# Patient Record
Sex: MALE | Race: BLACK OR AFRICAN AMERICAN | Employment: FULL TIME | URBAN - METROPOLITAN AREA
[De-identification: names, ages, dates, MRNs, and addresses within clinical notes are randomized per-mention and may not be internally consistent; named-entity substitution may affect disease eponyms.]

---

## 2019-05-30 ENCOUNTER — PREP FOR PROCEDURE (OUTPATIENT)
Dept: PAIN MANAGEMENT | Age: 59
End: 2019-05-30

## 2019-05-30 ENCOUNTER — OFFICE VISIT (OUTPATIENT)
Dept: PAIN MANAGEMENT | Age: 59
End: 2019-05-30
Payer: COMMERCIAL

## 2019-05-30 VITALS
DIASTOLIC BLOOD PRESSURE: 80 MMHG | TEMPERATURE: 98.6 F | OXYGEN SATURATION: 98 % | WEIGHT: 175 LBS | SYSTOLIC BLOOD PRESSURE: 128 MMHG | HEART RATE: 55 BPM | HEIGHT: 73 IN | RESPIRATION RATE: 16 BRPM | BODY MASS INDEX: 23.19 KG/M2

## 2019-05-30 DIAGNOSIS — M54.12 CERVICAL RADICULOPATHY: Primary | ICD-10-CM

## 2019-05-30 DIAGNOSIS — M50.90 CERVICAL DISC DISORDER: Primary | ICD-10-CM

## 2019-05-30 DIAGNOSIS — G89.4 CHRONIC PAIN SYNDROME: ICD-10-CM

## 2019-05-30 DIAGNOSIS — M54.12 CERVICAL RADICULOPATHY: ICD-10-CM

## 2019-05-30 DIAGNOSIS — M47.22 OSTEOARTHRITIS OF SPINE WITH RADICULOPATHY, CERVICAL REGION: ICD-10-CM

## 2019-05-30 DIAGNOSIS — M47.812 CERVICAL FACET JOINT SYNDROME: ICD-10-CM

## 2019-05-30 PROCEDURE — 99204 OFFICE O/P NEW MOD 45 MIN: CPT | Performed by: PAIN MEDICINE

## 2019-05-30 RX ORDER — HYDROCODONE BITARTRATE AND ACETAMINOPHEN 5; 325 MG/1; MG/1
TABLET ORAL
Refills: 0 | COMMUNITY
Start: 2019-05-22

## 2019-05-30 RX ORDER — METOPROLOL SUCCINATE 25 MG/1
TABLET, EXTENDED RELEASE ORAL
Refills: 0 | COMMUNITY
Start: 2019-05-22

## 2019-05-30 RX ORDER — CLOPIDOGREL BISULFATE 75 MG/1
TABLET ORAL
Refills: 0 | COMMUNITY
Start: 2019-05-22

## 2019-05-30 RX ORDER — GABAPENTIN 300 MG/1
CAPSULE ORAL
Refills: 0 | COMMUNITY
Start: 2019-05-22

## 2019-05-30 SDOH — HEALTH STABILITY: MENTAL HEALTH: HOW MANY STANDARD DRINKS CONTAINING ALCOHOL DO YOU HAVE ON A TYPICAL DAY?: 1 OR 2

## 2019-05-30 NOTE — PROGRESS NOTES
James Valle presents to the [facility] on 5/30/2019. Niida Bunch is complaining of pain in his neck. . The pain is intermittent. The pain is described as aching, numb and stiff. . Pain is rated on his best day at a 4, on his worst day at a 7, and on average at a 5 on the VAS scale. He took his last dose of Norco this AM.. Any procedures since your last visit: No  He has been on anticoagulation medications to include Plavix and is managed by Kim Catalan DO  .      Pacemaker or defibrilator: No Physician managing device is NA.       /80   Pulse 55   Temp 98.6 °F (37 °C) (Oral)   Resp 16   Ht 6' 1\" (1.854 m)   Wt 175 lb (79.4 kg)   SpO2 98%   BMI 23.09 kg/m²

## 2019-05-30 NOTE — PROGRESS NOTES
[address] [facility], [address] [facility]      [phone]          Consult Note      Patient:  MARTINA Cantu 1960    Date of Service:  19    Requesting Physician:  Felix Walker MD    Reason for Consult:      Patient presents with complaints of neck pain that started one year ago and has been stable    HISTORY OF PRESENT ILLNESS:      Pain does radiate to right upper extremity down to the hand. He  has numbness, tingling of the right hand and does not have bladder or bowel dysfunction. Imaging: MRI cervical spine 2018  Spondylotic changes and facet arthrosis  Mild degenerative anterior listhesis of T1 on T2  Varying degrees of central stenosis and foraminal narrowing. Note made of mild cord flattening at C5-6 and C6-7. Previous treatments: Physical Therapy and medications. .      Past Medical History:   Diagnosis Date    Neck pain     Osteoarthritis      Past Surgical History:   Procedure Laterality Date    CARDIAC CATHETERIZATION      CORONARY ANGIOPLASTY WITH STENT PLACEMENT       Prior to Admission medications    Medication Sig Start Date End Date Taking?  Authorizing Provider   clopidogrel (PLAVIX) 75 MG tablet take 1 tablet by mouth once daily 19  Yes Historical Provider, MD   metoprolol succinate (TOPROL XL) 25 MG extended release tablet take 1 tablet by mouth once daily 19  Yes Historical Provider, MD   HYDROcodone-acetaminophen (Odetta Bare) 5-325 MG per tablet take 1 tablet by mouth three times a day 19  Yes Historical Provider, MD   gabapentin (NEURONTIN) 300 MG capsule take 1 capsule by mouth three times a day 19   Historical Provider, MD     No Known Allergies    Social History     Socioeconomic History    Marital status:      Spouse name: Not on file    Number of children: Not on file    Years of education: Not on file    Highest education level: Not on file   Occupational History    Pattern: regular, no distress    Abdomen:    Shape:non-distended and normal  Tenderness:none    Cervical spine:    Inspection:normal  Palpation:tenderness paravertebral muscles, facet loading, right, positive and tenderness. Range of motion:abnormal mildly flexion, extension rotation right and is  painful. Musculoskeletal:    Trigger points in Paraveteral:absent bilaterally  Ramos's:negative right, negative left     Extremities:    Tremors:None bilaterally upper and lower  Range of motion:Generally normal shoulders  Intact:Yes  Edema:Normal    Neurological:    Sensory:normal to light touch bilateral upper extremities  Motor:           Right Bicep5/5           Left Bicep5/5              Right Triceps5/5       Left Triceps5/5          Right Deltoid5/5     Left Deltoid5/5                  Right Quadriceps5/5          Left Quadriceps5/5           Right Gastrocnemius5/5    Left Gastrocnemius5/5  Right Ant Tibialis5/5  Left Ant Tibialis5/5  Reflexes:    Right Brachioradialis reflex2+  Left Brachioradialis reflex2+  Right Biceps reflex2+  Left Biceps reflex2+  Right Triceps reflex2+  Left Triceps reflex2+  Right Quadriceps reflex2+  Left Quadriceps reflex2+  Right Achilles reflex2+  Left Achilles reflex2+  Gait:normal    Dermatology:    Skin:no unusual rashes and no skin lesions    Impression:  Chronic neck pain with radiation to the Left upper extremity down to the hand  Cervical spine MRI: Varying degrees of central stenosis and foraminal narrowing.   Plan:  Axial neck pain with cervical radiculopathy  Discussed treatment options with the patient including interventional procedures and non opioid medications   Will schedule patient for GINGER C7-T1 right paramedian  Continue with Norco 5/325 TID through    On Plavix and ASA for cardiac stents, will get clearance  Hold off on urine screen, no opioids started  OARRS report reviewed  Patient encouraged to stay active and to lose weight  Treatment plan discussed with the patient including medications and procedure side effects     We discussed with the patient that combining opioids, benzodiazepines, alcohol, illicit drugs or sleep aids increases the risk of respiratory depression including death. We discussed that these medications may cause drowsiness, sedation or dizziness and have counseled the patient not to drive or operate machinery. We have discussed that these medications will be prescribed only by one provider. We have discussed with the patient about age related risk factors and have thoroughly discussed the importance of taking these medications as prescribed. The patient verbalizes understanding. osvaldo Frank M.D.

## 2019-06-12 ENCOUNTER — TELEPHONE (OUTPATIENT)
Dept: PAIN MANAGEMENT | Age: 59
End: 2019-06-12

## 2019-06-19 ENCOUNTER — HOSPITAL ENCOUNTER (OUTPATIENT)
Age: 59
Setting detail: OUTPATIENT SURGERY
Discharge: HOME | End: 2019-06-19
Attending: PAIN MEDICINE | Admitting: PAIN MEDICINE
Payer: COMMERCIAL

## 2019-06-19 ENCOUNTER — HOSPITAL ENCOUNTER (OUTPATIENT)
Dept: OPERATING ROOM | Age: 59
Setting detail: OUTPATIENT SURGERY
Discharge: HOME | End: 2019-06-19
Attending: PAIN MEDICINE
Payer: COMMERCIAL

## 2019-06-19 VITALS
SYSTOLIC BLOOD PRESSURE: 125 MMHG | DIASTOLIC BLOOD PRESSURE: 82 MMHG | HEART RATE: 58 BPM | OXYGEN SATURATION: 99 % | RESPIRATION RATE: 16 BRPM

## 2019-06-19 DIAGNOSIS — M54.12 CERVICAL RADICULOPATHY: ICD-10-CM

## 2019-06-19 PROCEDURE — 6360000004 HC RX CONTRAST MEDICATION: Performed by: PAIN MEDICINE

## 2019-06-19 PROCEDURE — 7100000011 HC PHASE II RECOVERY - ADDTL 15 MIN: Performed by: PAIN MEDICINE

## 2019-06-19 PROCEDURE — 3209999900 FLUORO FOR SURGICAL PROCEDURES

## 2019-06-19 PROCEDURE — 7100000010 HC PHASE II RECOVERY - FIRST 15 MIN: Performed by: PAIN MEDICINE

## 2019-06-19 PROCEDURE — 62321 NJX INTERLAMINAR CRV/THRC: CPT | Performed by: PAIN MEDICINE

## 2019-06-19 PROCEDURE — 6360000002 HC RX W HCPCS: Performed by: PAIN MEDICINE

## 2019-06-19 PROCEDURE — 2500000003 HC RX 250 WO HCPCS: Performed by: PAIN MEDICINE

## 2019-06-19 PROCEDURE — 2709999900 HC NON-CHARGEABLE SUPPLY: Performed by: PAIN MEDICINE

## 2019-06-19 PROCEDURE — 3600000005 HC SURGERY LEVEL 5 BASE: Performed by: PAIN MEDICINE

## 2019-06-19 RX ORDER — LIDOCAINE HYDROCHLORIDE 5 MG/ML
INJECTION, SOLUTION INFILTRATION; INTRAVENOUS PRN
Status: DISCONTINUED | OUTPATIENT
Start: 2019-06-19 | End: 2019-06-19 | Stop reason: ALTCHOICE

## 2019-06-19 ASSESSMENT — PAIN SCALES - GENERAL
PAINLEVEL_OUTOF10: 0
PAINLEVEL_OUTOF10: 0

## 2019-06-19 ASSESSMENT — PAIN - FUNCTIONAL ASSESSMENT: PAIN_FUNCTIONAL_ASSESSMENT: 0-10

## 2019-06-19 NOTE — OP NOTE
2019    Patient: Ash Ocampo  :  1960  Age:  62 y.o. Sex:  male     PRE-PROCEDURE DIAGNOSIS: Cervical degenerative disc disease, cervical radicular pain. POST-PROCEDURE DIAGNOSIS: Same. PROCEDURE: Fluoroscopic guided cervical epidural steroid injection, #1 at C7-T1 level. SURGEON: RYAN Woodall M.D. ANESTHESIA: Local    ESTIMATED BLOOD LOSS: None.  ______________________________________________________________________  BRIEF HISTORY:  Ash Ocampo comes in today for the first  therapeutic cervical epidural injection under fluoroscopic guidance. The potential complications of this procedure were discussed with the him again today. He has elected to undergo the aforementioned procedure. [facility] complete History & Physical examination were reviewed in depth, a copy of which is in the chart. DESCRIPTION OF PROCEDURE:    After confirming written and informed consent, a time-out was performed and [facility] name and date of birth, the procedure to be performed as well as the plan for the location of the needle insertion were confirmed. The patient was brought into the procedure room and placed in the prone position with the head flexed midline on the fluoroscopy table. A pillow was placed under the patient's head to increase cervical interlaminar space. Standard monitors were placed, and vital signs were observed throughout the procedure. The area was prepped with chloraprep and the C7-T1 interspace was marked under fluoroscopy. The skin and subcutaneous tissues at the above level were anesthestized with 0.5% lidocaine. An # 18 gauge 3 1/2 tuohy epidural needle was inserted and advanced toward the inferior lamina until bony contact was made. The needle was then advanced superiorly toward epidural space . From this point on hanging drop/loss of resistance technique with 5 cc glass syringe was used to confirm entrance of the needle into the epidural space under intermittent lateral fluoroscopy. Once in the epidural space , negative aspiration for blood and CSF was confirmed . Needle tip placement was confirmed by visualizing epidural spread of 0.5 ml of omnipaque 240 visualized in both AP and lateral live fluoroscopic views. Then after negative aspiration, a solution of 0.5 % Lidocaine 3 ml and 40 mg DepoMedrol was easily injected. The needle was gently removed intact. The patient neck was cleaned and a Band-Aid was placed over the needle insertion point. Disposition the patient tolerated the procedure well and there were no complications . Vital signs remained stable throughout the procedure. The patient was escorted to the recovery area where they remained until discharge and written discharge instructions for the procedure were given. Plan: Vu Gomes will return to our pain management center as scheduled.      Fariha Modi MD

## 2019-06-19 NOTE — H&P
[address]  [address] [facility], [address] [place] [address]  [phone]    Procedure History & Physical      Ree Chin     HPI:    Patient  is here for neck and right arm pain for GINGER C7-T1  Labs/imaging studies reviewed   All question and concerns addressed including R/B/A associated with the procedure    Past Medical History:   Diagnosis Date    Neck pain     Osteoarthritis        Past Surgical History:   Procedure Laterality Date    CARDIAC CATHETERIZATION      CORONARY ANGIOPLASTY WITH STENT PLACEMENT         Prior to Admission medications    Medication Sig Start Date End Date Taking? Authorizing Provider   clopidogrel (PLAVIX) 75 MG tablet take 1 tablet by mouth once daily 5/22/19   Historical Provider, MD   metoprolol succinate (TOPROL XL) 25 MG extended release tablet take 1 tablet by mouth once daily 5/22/19   Historical Provider, MD   HYDROcodone-acetaminophen ([facility]) 5-325 MG per tablet take 1 tablet by mouth three times a day 5/22/19   Historical Provider, MD   gabapentin (NEURONTIN) 300 MG capsule take 1 capsule by mouth three times a day 5/22/19   Historical Provider, MD       No Known Allergies    Social History     Socioeconomic History    Marital status:      Spouse name: Not on file    Number of children: Not on file    Years of education: Not on file    Highest education level: Not on file   Occupational History    Not on file   Social Needs    Financial resource strain: Not on file    Food insecurity:     Worry: Not on file     Inability: Not on file    Transportation needs:     Medical: Not on file     Non-medical: Not on file   Tobacco Use    Smoking status: Current Every Day Smoker     Types: Cigarettes    Smokeless tobacco: Never Used   Substance and Sexual Activity    Alcohol use:  Yes     Alcohol/week: 1.2 oz     Types: 2 Cans of beer per week     Drinks per session: 1 or 2    Drug use: Never    Sexual activity: Not on file   Lifestyle diaphoresis. NEURO: Cranial nerves II-XII grossly intact. No signs of agitated mood.        Assessment/Plan:    Neck and right arm  pain for GINGER C7-T1 right paramedian

## 2021-01-11 ENCOUNTER — HOSPITAL ENCOUNTER (OUTPATIENT)
Dept: NON INVASIVE DIAGNOSTICS | Age: 61
Discharge: HOME | End: 2021-01-11
Payer: MEDICARE

## 2021-01-11 PROCEDURE — 93226 XTRNL ECG REC<48 HR SCAN A/R: CPT

## 2021-01-11 PROCEDURE — 93225 XTRNL ECG REC<48 HRS REC: CPT

## 2021-01-16 NOTE — PROCEDURES
[address]                                 HOLTER MONITOR    PATIENT NAME: Willis Hamm                       :        1960  MED REC NO:   [id number]                            ROOM:  ACCOUNT NO:   [de-identified]                           ADMIT DATE: 2021  PROVIDER:     Daniel Daly MD    HOLTER MONITOR 24-HOURS    DATE OF STUDY:  2021    INDICATIONS:  Palpitations. ORDERING PHYSICIAN:  Dr. Koki Smith. Predominant rhythm is sinus rhythm with minimum heart rate of 52 beats  per minute, maximum heart rate of 136 beats per minute, average heart  rate of 86 beats per minute. There were 13,316 premature ventricular beats. There were 245 premature supraventricular beats. There were no pauses recorded. The patient reported no symptoms during the recording. IMPRESSION:  1. Predominant sinus rhythm with average heart rate of 86 beats per  minute. 2.  Frequent PVCs (11.4% of the total beats). 3.  Occasional PACs. 4.  The patient reported no symptoms during the recording.         Honorio Welsh MD    D: 01/15/2021 21:55:38       T: 2021 3:24:02     [email or web address]  Job#: [id number]     Doc#: [id number]    CC:

## 2021-01-17 PROCEDURE — 93227 XTRNL ECG REC<48 HR R&I: CPT | Performed by: INTERNAL MEDICINE

## 2021-04-15 ENCOUNTER — HOSPITAL ENCOUNTER (OUTPATIENT)
Dept: MRI IMAGING | Age: 61
Discharge: HOME | End: 2021-04-17
Payer: MEDICARE

## 2021-04-15 DIAGNOSIS — M54.50 LOW BACK PAIN, UNSPECIFIED BACK PAIN LATERALITY, UNSPECIFIED CHRONICITY, UNSPECIFIED WHETHER SCIATICA PRESENT: ICD-10-CM

## 2021-04-15 DIAGNOSIS — M47.816 LUMBAR SPONDYLOSIS: ICD-10-CM

## 2021-04-15 PROCEDURE — 72148 MRI LUMBAR SPINE W/O DYE: CPT

## 2021-06-25 ENCOUNTER — INITIAL CONSULT (OUTPATIENT)
Dept: NEUROSURGERY | Age: 61
End: 2021-06-25
Payer: MEDICARE

## 2021-06-25 ENCOUNTER — HOSPITAL ENCOUNTER (OUTPATIENT)
Dept: GENERAL RADIOLOGY | Age: 61
Discharge: HOME | End: 2021-06-27
Payer: MEDICARE

## 2021-06-25 ENCOUNTER — HOSPITAL ENCOUNTER (OUTPATIENT)
Age: 61
Discharge: HOME | End: 2021-06-27
Payer: MEDICARE

## 2021-06-25 VITALS
BODY MASS INDEX: 20.94 KG/M2 | HEART RATE: 89 BPM | DIASTOLIC BLOOD PRESSURE: 83 MMHG | SYSTOLIC BLOOD PRESSURE: 116 MMHG | HEIGHT: 73 IN | WEIGHT: 158 LBS

## 2021-06-25 DIAGNOSIS — M47.816 LUMBAR SPONDYLOSIS: ICD-10-CM

## 2021-06-25 DIAGNOSIS — M51.26 LUMBAR DISC HERNIATION: ICD-10-CM

## 2021-06-25 DIAGNOSIS — M48.07 SPINAL STENOSIS OF LUMBOSACRAL REGION: ICD-10-CM

## 2021-06-25 DIAGNOSIS — M54.16 LUMBAR RADICULOPATHY: ICD-10-CM

## 2021-06-25 DIAGNOSIS — M48.07 SPINAL STENOSIS OF LUMBOSACRAL REGION: Primary | ICD-10-CM

## 2021-06-25 PROCEDURE — 72120 X-RAY BEND ONLY L-S SPINE: CPT

## 2021-06-25 PROCEDURE — G8427 DOCREV CUR MEDS BY ELIG CLIN: HCPCS | Performed by: NEUROLOGICAL SURGERY

## 2021-06-25 PROCEDURE — G8420 CALC BMI NORM PARAMETERS: HCPCS | Performed by: NEUROLOGICAL SURGERY

## 2021-06-25 PROCEDURE — 4004F PT TOBACCO SCREEN RCVD TLK: CPT | Performed by: NEUROLOGICAL SURGERY

## 2021-06-25 PROCEDURE — 3017F COLORECTAL CA SCREEN DOC REV: CPT | Performed by: NEUROLOGICAL SURGERY

## 2021-06-25 PROCEDURE — 99203 OFFICE O/P NEW LOW 30 MIN: CPT | Performed by: NEUROLOGICAL SURGERY

## 2021-06-25 RX ORDER — DULOXETIN HYDROCHLORIDE 30 MG/1
30 CAPSULE, DELAYED RELEASE ORAL DAILY
COMMUNITY

## 2021-06-25 RX ORDER — PRAVASTATIN SODIUM 40 MG
40 TABLET ORAL DAILY
COMMUNITY
End: 2022-05-31

## 2021-06-25 ASSESSMENT — ENCOUNTER SYMPTOMS
RESPIRATORY NEGATIVE: 1
GASTROINTESTINAL NEGATIVE: 1
BACK PAIN: 1
ALLERGIC/IMMUNOLOGIC NEGATIVE: 1
EYES NEGATIVE: 1

## 2021-06-25 NOTE — PROGRESS NOTES
Alcohol/week: 2.0 standard drinks     Types: 2 Cans of beer per week    Drug use: Never    Sexual activity: Not on file   Other Topics Concern    Not on file   Social History Narrative    Not on file     Social Determinants of Health     Financial Resource Strain:     Difficulty of Paying Living Expenses:    Food Insecurity:     Worried About Running Out of Food in the Last Year:     [address] in the Last Year:    Transportation Needs:     Lack of Transportation (Medical):  Lack of Transportation (Non-Medical):    Physical Activity:     Days of Exercise per Week:     Minutes of Exercise per Session:    Stress:     Feeling of Stress :    Social Connections:     Frequency of Communication with Friends and Family:     Frequency of Social Gatherings with Friends and Family:     Attends Oriental orthodox Services:     Active Member of Clubs or Organizations:     Attends Club or Organization Meetings:     Marital Status:    Intimate Partner Violence:     Fear of Current or Ex-Partner:     Emotionally Abused:     Physically Abused:     Sexually Abused:        Medications:   Current Outpatient Medications   Medication Sig Dispense Refill    DULoxetine (CYMBALTA) 30 MG extended release capsule Take 30 mg by mouth daily      pravastatin (PRAVACHOL) 40 MG tablet Take 40 mg by mouth daily      clopidogrel (PLAVIX) 75 MG tablet take 1 tablet by mouth once daily  0    metoprolol succinate (TOPROL XL) 25 MG extended release tablet take 1 tablet by mouth once daily  0    HYDROcodone-acetaminophen (NORCO) 5-325 MG per tablet take 1 tablet by mouth three times a day  0    gabapentin (NEURONTIN) 300 MG capsule take 1 capsule by mouth three times a day  0     No current facility-administered medications for this visit. Allergies:    Patient has no known allergies. Review of Systems   Constitutional: Negative. HENT: Negative. Eyes: Negative. Respiratory: Negative.     Cardiovascular: Negative. Gastrointestinal: Negative. Endocrine: Negative. Genitourinary: Negative. Musculoskeletal: Positive for back pain. Skin: Negative. Allergic/Immunologic: Negative. Neurological: Positive for weakness and numbness. Hematological: Negative. Psychiatric/Behavioral: Negative. Physical Exam  Vitals and nursing note reviewed. HENT:      Head: Normocephalic. Right Ear: Tympanic membrane normal.      Nose: Nose normal.      Mouth/Throat:      Mouth: Mucous membranes are moist.   Eyes:      Extraocular Movements: Extraocular movements intact. Conjunctiva/sclera: Conjunctivae normal.      Pupils: Pupils are equal, round, and reactive to light. Cardiovascular:      Rate and Rhythm: Normal rate and regular rhythm. Pulses: Normal pulses. Heart sounds: Normal heart sounds. Pulmonary:      Effort: Pulmonary effort is normal.      Breath sounds: Normal breath sounds. Abdominal:      Palpations: Abdomen is soft. Musculoskeletal:         General: Normal range of motion. Cervical back: Normal range of motion and neck supple. Skin:     General: Skin is warm. Neurological:      Mental Status: He is alert and oriented to person, place, and time. Cranial Nerves: Cranial nerves are intact. Coordination: Coordination is intact. Deep Tendon Reflexes: Reflexes are normal and symmetric. Comments: 4/5 in the HF, DF   Psychiatric:         Mood and Affect: Mood normal.          /83 (Site: Left Upper Arm, Position: Sitting)   Pulse 89   Ht 6' 1\" (1.854 m)   Wt 158 lb (71.7 kg)   BMI 20.85 kg/m²        Assessment:   · MRI shows L1-S1 spondylosis with moderate to severe stenosis    Plan:  · I discussed the surgical and nonsurgical options with the patient. Patient has not attempted conservative therapy yet and is not interested in surgery at this time. I am ordering PT and a visit with Pain Management.   If the patient does not get relief from these conservative measures and would like to consider surgery, they can followup with me.      ·       Electronically signed by Maryann Macias MD on 6/25/2021 at 12:53 PM

## 2021-06-25 NOTE — PATIENT INSTRUCTIONS
Patient Education        Lumbar Spinal Fusion: Before Your Surgery  What is lumbar spinal fusion? Spinal fusion is surgery that joins, or fuses, two or more vertebrae together. The joints will no longer be able to move. The surgery is also called arthrodesis. Most of the time, bone from your pelvic bone or from a bone bank is used to make a \"bridge\" between the vertebrae to be joined. Metal rods, wires, or screws are often attached to the vertebrae. This will hold them together until new bone grows between them. Spinal fusion is major surgery. It usually lasts several hours. It involves making a cut in your back or your belly, or sometimes both. The cuts, called incisions, leave scars that fade with time. You can expect your back to feel stiff and sore after surgery. You will be given pain medicine. You will probably get up and walk at the hospital. Spring Miller will be in the hospital for several days. It may take 4 to 6 weeks to get back to doing simple activities, such as light housework. Follow-up care is a key part of your treatment and safety. Be sure to make and go to all appointments, and call your doctor if you are having problems. It's also a good idea to know your test results and keep a list of the medicines you take. How do you prepare for surgery? Surgery can be stressful. This information will help you understand what you can expect. And it will help you safely prepare for surgery. Preparing for surgery    · Be sure you have someone to take you home. Anesthesia and pain medicine will make it unsafe for you to drive or get home on your own.     · Understand exactly what surgery is planned, along with the risks, benefits, and other options.     · If you take aspirin or some other blood thinner, ask your doctor if you should stop taking it before your surgery. Make sure that you understand exactly what your doctor wants you to do.  These medicines increase the risk of bleeding.     · Tell your doctor ALL the medicines, vitamins, supplements, and herbal remedies you take. Some may increase the risk of problems during your surgery. Your doctor will tell you if you should stop taking any of them before the surgery and how soon to do it.     · Make sure your doctor and the hospital have a copy of your advance directive. If you don't have one, you may want to prepare one. It lets others know your health care wishes. It's a good thing to have before any type of surgery or procedure. What happens on the day of surgery? · Follow the instructions exactly about when to stop eating and drinking. If you don't, your surgery may be canceled. If your doctor told you to take your medicines on the day of surgery, take them with only a sip of water.     · Take a bath or shower before you come in for your surgery. Do not apply lotions, perfumes, deodorants, or nail polish.     · Do not shave the surgical site yourself.     · Take off all jewelry and piercings. And take out contact lenses, if you wear them. At the hospital or surgery center   · Bring a picture ID.     · The area for surgery is often marked to make sure there are no errors.     · You will be kept comfortable and safe by your anesthesia provider. You will be asleep during the surgery.     · Surgery will take several hours. When should you call your doctor? · You have questions or concerns.     · You do not understand how to prepare for your surgery.     · You become ill before surgery (such as fever, cold or flu, chest pain, or shortness of breath).     · You need to reschedule or have changed your mind about having the surgery. Where can you learn more? Go to https://[email or web address]. org and sign in to your [employer] account. Enter Y819 in the [employer] box to learn more about \"Lumbar Spinal Fusion: Before Your Surgery. \"     If you do not have an account, please click on the \"Sign Up Now\" link.   Current as of: November 16, 2020               Content Version: 12.9  © 1563-2745 Healthwise, Incorporated. Care instructions adapted under license by [facility] ([facility]). If you have questions about a medical condition or this instruction, always ask your healthcare professional. [address] any warranty or liability for your use of this information.

## 2021-06-30 ENCOUNTER — TELEPHONE (OUTPATIENT)
Dept: PHYSICAL THERAPY | Age: 61
End: 2021-06-30

## 2021-06-30 PROBLEM — M48.07 SPINAL STENOSIS OF LUMBOSACRAL REGION: Status: ACTIVE | Noted: 2021-06-30

## 2021-06-30 PROBLEM — M51.26 LUMBAR DISC HERNIATION: Status: ACTIVE | Noted: 2021-06-30

## 2021-06-30 PROBLEM — M54.16 LUMBAR RADICULOPATHY: Status: ACTIVE | Noted: 2021-06-30

## 2021-06-30 PROBLEM — M47.816 LUMBAR SPONDYLOSIS: Status: ACTIVE | Noted: 2021-06-30

## 2021-07-08 ENCOUNTER — HOSPITAL ENCOUNTER (OUTPATIENT)
Dept: PHYSICAL THERAPY | Age: 61
Setting detail: THERAPIES SERIES
Discharge: HOME | End: 2021-07-08
Payer: MEDICARE

## 2021-07-08 PROCEDURE — 97110 THERAPEUTIC EXERCISES: CPT | Performed by: PHYSICAL THERAPIST

## 2021-07-08 PROCEDURE — 97162 PT EVAL MOD COMPLEX 30 MIN: CPT | Performed by: PHYSICAL THERAPIST

## 2021-07-08 NOTE — PROGRESS NOTES
[facility] OUTPATIENT REHABILITATION  PHYSICAL THERAPY INITIAL EVALUATION         Date:  2021   Patient: Maria Luisa Du  : 1960  MRN: [id number]  Referring Provider: Fantasma Sanchez MD  [address] [place] [address]. [facility],  [facility]     Medical Diagnosis: Spinal stenosis of lumbosacral region (M48.07 [ICD-10-CM]); Lumbar spondylosis (M47.816 [ICD-10-CM]); Lumbar disc herniation (M51.26 [ICD-10-CM]); Lumbar radiculopathy (M54.16 [ICD-10-CM])        SUBJECTIVE:     Onset date: 9 years ago    Onset: Insidious onset    Mechanism of Injury: Insidious onset    Previous PT: none    Medical Management for Current Problem: medications, injections, epidurals    Chief complaint: Constant pain in center of L/S that radiates into both hips and groin. Tingling in BLE    Behavior: condition is getting worse    Pain: constant  Current: 4/10     Best: 4/10     Worst:8/10    Symptom Type/Quality: aching  Location[de-identified] Back: lumbar region, right lateral side and left lateral side , radiates into both groin       Provoking Activities/Positions: standing, walking, lifting/carrying/material handling                 Relieving Activitie/Positions: sitting, trunk flex    Disturbed Sleep: no  Bladder Dysfunction: no  Bowel Dysfunction: no     Imaging results: XR LUMBAR SPINE FLEXION AND EXTENSION ONLY    Result Date: 2021  EXAMINATION: 3 XRAY VIEWS OF THE LUMBAR SPINE with flexion and extension. 2021 12:45 pm COMPARISON: None. HISTORY: ORDERING SYSTEM PROVIDED HISTORY: Spinal stenosis of lumbosacral region TECHNOLOGIST PROVIDED HISTORY: Reason for exam:->STANDING LUMBAR FLEXION EXTENSION XRAYS What reading provider will be dictating this exam?->CRC FINDINGS: Minimal retrolisthesis of L2 on L3 is degenerative. Degenerative disc disease is noted which is particularly pronounced from L4-S1. Degenerative facet arthropathy is less pronounced primarily from L4-S1.   No evidence of instability on flexion or extension. Nothing else active. No evidence of spinal instability. Degenerative spondylotic changes with altered alignment as noted. Past Medical History:  Past Medical History:   Diagnosis Date    History of Holter monitoring 01/11/2021    Neck pain     Osteoarthritis      Past Surgical History:   Procedure Laterality Date    CARDIAC CATHETERIZATION      CORONARY ANGIOPLASTY WITH STENT PLACEMENT      EPIDURAL STEROID INJECTION Right 6/19/2019    CERVICAL EPIDURAL STEROID INJECTION C7-T1 RIGHT PARAMEDIAN [facility]  06/19/2019    cervical epidural steroid injection        Medications:   Current Outpatient Medications   Medication Sig Dispense Refill    DULoxetine (CYMBALTA) 30 MG extended release capsule Take 30 mg by mouth daily      pravastatin (PRAVACHOL) 40 MG tablet Take 40 mg by mouth daily      clopidogrel (PLAVIX) 75 MG tablet take 1 tablet by mouth once daily  0    metoprolol succinate (TOPROL XL) 25 MG extended release tablet take 1 tablet by mouth once daily  0    HYDROcodone-acetaminophen (NORCO) 5-325 MG per tablet take 1 tablet by mouth three times a day  0    gabapentin (NEURONTIN) 300 MG capsule take 1 capsule by mouth three times a day  0     No current facility-administered medications for this encounter. Occupation: none    Exercise regimen: Theraband exercise for cerv problems    Hobbies: fishing  , [employer]    Patient Goals: pain relief    Contraindications/Precautions: none    OBJECTIVE:     Inspection:  Standing    Cervical: Forward Head   [x] Normal   []Mild   [] Moderate   []Severe      Thoracic:         [x] Normal   [] Increased Kyphosis  [] Decreased Kyphosis    Lumbar:   [x] Normal   [] Increased Lordosis   [] Decreased Lordosis           Observations: well nourished male    Gait: normal    Functional Strength:    Able to toe walk, heel walk, and squat.     Range of Motion:       Trunk:   Flexion:  [x] Normal   [] Limited    Extension:  [x] Normal   [] Limited     Right Rotation:  [x] Normal   [] Limited    Left Rotation:  [x] Normal   [] Limited    Right Side Bending: [x] Normal   [] Limited    Left Side Bending: [x] Normal   [] Limited         Lower Extremity:   Right:   [x] Normal   [] Limited   Left:   [x] Normal   [] Limited       Strength:     Trunk flex: 3-/5   R LE :      HIP              Flex= 4+/5        L LE:      HIP              Flex= 4+/5                 Palpation: Tender to palpation across beltline     Sensation: intact to light touch and temperature. Special Tests:      [x] Slump           Right []+ / [x] -    Left []+ / [x] -  [x] SLR           Right []+ / [x] -    Left []+ / [x] -     [x] RAYMUNDO          Right []+ / [x] -    Left []+ / [x] -           Special Test Comments:     ASSESSMENT     Patient has limited trunk ROM and weakness of trunk leading to lumbar instability and resulting in pain with functional mobility and work/home tasks. Consequently lumbar stabilization and gentle ROM exercises a will be initial treatment progressing to functional exercise to facilitate return to prior level of function and mobility. Outcome Measure: Oswestry= 33%    Problems:   1. Pain reported 4-8/10  2. ROM decreased   3. Strength decreased trunk  4. Decreased functional ability with walking, standing, lifting, carrying    [x] There are no barriers affecting plan of care or recovery    [] Barriers to this patient's plan of care or recovery include. Domestic Concerns:  [x] No  [] Yes:        Short Term goals (3 weeks)  1. Decrease reported pain to 5/10  2. Increase ROM to WNL  3. Increase Strength by 1/3 mm grade  4. Able to perform/complete the following functions/tasks: walking, standing, lifting, carrying  5. Oswestry Low Back Disability Questionnaire 20% disability    Long Term goals (6 weeks)  1. Decrease reported pain to 0-3/10  2. Increase ROM to WNL  3. Increase Strength to WNL   4.  Able to perform/complete the following functions/tasks: walking, standing, lifting, carrying   5. Oswestry Low Back Disability Questionnaire 10% disability   6. Independent with Home Exercise Programs    Rehab Potential: [x] Good  [] Fair  [] Poor    PLAN     LBP- trunk fexion/extension therex with lumbar stabilization exercise. MH and ES to help with pain. Specific Provider Orders: Eval and treat    Physical therapy plan of care is established based on physician order, patient diagnosis and clinical assessment. Treatment Plan:  [x] Therapeutic Exercise  [x] Therapeutic Activity  [x] Neuromuscular Re-education   [] Gait Training  [] Balance Training  [] Aerobic conditioning  [] Manual Therapy  [] Massage/Fascial release   [] Work/Sport specific activities    [] Pain Neuroscience [x] Cold/hotpack  [] Vasocompression  [x] Electrical Stimulation  [] Lumbar/Cervical Traction  [] Ultrasound   [] Iontophoresis: 4 mg/mL Dexamethasone Sodium Phosphate 40-80 mAmin        [x] Instruction in HEP      []  Medication allergies reviewed for use of Dexamethasone Sodium Phosphate 4mg/ml  with iontophoresis treatments. Patient is not allergic. The following CPT codes are likely to be used in the care of this patient: 48472 PT Evaluation: Moderate Complexity , 55655 Therapeutic Exercise , 43280 Therapeutic Activities ,  Electrical Stimulation      Suggested Professional Referral: [x] No  [] Yes:     Patient Education:  [x] Plans/Goals, Risks/Benefits discussed  [x] Home exercise program  Method of Education: [x] Verbal  [x] Demo  [x] Written  Comprehension of Education:  [x] Verbalizes understanding. [x] Demonstrates understanding. [] Needs Review. [] Demonstrates/verbalizes understanding of HEP/Ed previously given.     Frequency:  2  days per week for 6 weeks    Patient understands diagnosis/prognosis and consents to treatment, plan and goals: [x] Yes    [] No     Thank you for the opportunity to work with your patient. If you have questions or comments, please contact me at [phone] fax: [phone]    Electronically signed by: Cristina Escalante PT         Please sign Physician's Certification and return to:  [facility], [address] [address]  Dept: [phone]  Dept Fax: [phone]  Loc: [phone] Certification / Comments     Frequency/Duration 2 days per week for 6 weeks. Certification period from 7/8/2021  to 9/8/2021. I have reviewed the Plan of Care established for skilled therapy services and certify that the services are required and that they will be provided while the patient is under my care.     Physician's Comments/Revisions:               Physician's Printed Name:                                           [de-identified] Signature:                                                               Date:

## 2021-07-14 ENCOUNTER — HOSPITAL ENCOUNTER (OUTPATIENT)
Dept: PHYSICAL THERAPY | Age: 61
Setting detail: THERAPIES SERIES
Discharge: HOME | End: 2021-07-14
Payer: MEDICARE

## 2021-07-14 NOTE — PROGRESS NOTES
Physical Therapy Progress Note    Date: 2021  Patient Name: Lovely Wood  : 1960   MRN: [id number]    Pt called to cancel PT appt. today    Marcy Peoples, PT

## 2021-07-19 ENCOUNTER — HOSPITAL ENCOUNTER (OUTPATIENT)
Dept: PHYSICAL THERAPY | Age: 61
Setting detail: THERAPIES SERIES
Discharge: HOME | End: 2021-07-19
Payer: MEDICARE

## 2021-07-19 PROCEDURE — G0283 ELEC STIM OTHER THAN WOUND: HCPCS | Performed by: PHYSICAL THERAPIST

## 2021-07-19 PROCEDURE — 97110 THERAPEUTIC EXERCISES: CPT | Performed by: PHYSICAL THERAPIST

## 2021-07-19 NOTE — PROGRESS NOTES
[facility]  Physical Therapy Treatment Note    Date: 2021  Patient Name: Blank Ontiveros  : 1960   MRN: [id number]     Referring Provider: Patricia Silva MD  [address] [place] [address]     Medical Diagnosis:Spinal stenosis of lumbosacral region (M48.07 [ICD-10-CM]); Lumbar spondylosis (M47.816 [ICD-10-CM]); Lumbar disc herniation (M51.26 [ICD-10-CM]); Lumbar radiculopathy (M54.16 [ICD-10-CM])  Outcome Measure:  Oswestry= 33%    S: Pt reports compliance with HEP. Reports pain is usually worse in right hip. O:   Time 08:00-08:53 2x/wk 6 weeks    Visit      Pain 4/10     ROM      Modalities      MH + ES L/S supine X 15 min                                             THERAPEUTIC ACTIVITIES  ALL EXERCISE DONE WITH DRAW-IN TECHNIQUE Large, functional, dynamic, global movements used to build strength, balance, endurance, and flexibility and to improve physical performance. ROWS: H G X 20 \"    ROWS: M G x 20 \"    ROWS: L  \"    Obliques - high  \"    Obliques - low  \"          THEREX      Nustep   L6 x 5 min     Punches      Lat pulldowns      Triceps ext standing G X20     Marching 2# x 2 min           Trunk ext TB      Trunk flex TB G  X 20     Hip abd 2# x 2 min     Hip EXT 2# x 2 min     TG Squats                  A:  Tolerated well.   Feels better with therex  P: Continue with rehab plan  Greg Johnson PT    Treatment Charges: Mins Units   Initial Evaluation       Re-Evaluation     Ther Exercise         TE  38  3   Manual Therapy     MT     Ther Activities        TA     Gait Training          GT     Neuro Re-education NR     Modalities 15 1   Non-Billable Service Time     Other     Total Time/Units 53   4

## 2021-07-21 ENCOUNTER — HOSPITAL ENCOUNTER (OUTPATIENT)
Dept: PHYSICAL THERAPY | Age: 61
Setting detail: THERAPIES SERIES
Discharge: HOME | End: 2021-07-21
Payer: MEDICARE

## 2021-07-21 PROCEDURE — 97110 THERAPEUTIC EXERCISES: CPT | Performed by: PHYSICAL THERAPIST

## 2021-07-21 PROCEDURE — G0283 ELEC STIM OTHER THAN WOUND: HCPCS | Performed by: PHYSICAL THERAPIST

## 2021-07-21 NOTE — PROGRESS NOTES
[facility]  Physical Therapy Treatment Note    Date: 2021  Patient Name: Kenneth Gaines  : 1960   MRN: [id number]     Referring Provider: Richard Griffin MD  [address] [facility]     Medical Diagnosis:Spinal stenosis of lumbosacral region (M48.07 [ICD-10-CM]); Lumbar spondylosis (M47.816 [ICD-10-CM]); Lumbar disc herniation (M51.26 [ICD-10-CM]); Lumbar radiculopathy (M54.16 [ICD-10-CM])  Outcome Measure:  Oswestry= 33%    S: Pt reports compliance with HEP. Reports pain is usually worse in right hip. O: Added lat pull downs today  Time 09:44-10:38 2x/wk 6 weeks    Visit 3/12     Pain 4/10     ROM      Modalities      MH + ES L/S supine X 15 min                                             THERAPEUTIC ACTIVITIES  ALL EXERCISE DONE WITH DRAW-IN TECHNIQUE Large, functional, dynamic, global movements used to build strength, balance, endurance, and flexibility and to improve physical performance. ROWS: H G X 20 \"    ROWS: M G x 20 \"    ROWS: L  \"    Obliques - high  \"    Obliques - low  \"          THEREX      Nustep   L6 x 5 min     Punches G x 20     Lat pulldowns G x 20     Triceps ext standing G X20     Marching 2# x 2 min           Trunk ext TB      Trunk flex TB G  X 20     Hip abd 2# x 2 min     Hip EXT 2# x 2 min     TG Squats                  A:  Tolerated well.   Feels better with therex  P: Continue with rehab plan  Matthews Cheadle, PT    Treatment Charges: Mins Units   Initial Evaluation       Re-Evaluation     Ther Exercise         TE  39  3   Manual Therapy     MT     Ther Activities        TA     Gait Training          GT     Neuro Re-education NR     Modalities 15 1   Non-Billable Service Time     Other     Total Time/Units 54  4

## 2021-07-22 ENCOUNTER — OFFICE VISIT (OUTPATIENT)
Dept: PAIN MANAGEMENT | Age: 61
End: 2021-07-22
Payer: MEDICARE

## 2021-07-22 VITALS
HEART RATE: 85 BPM | DIASTOLIC BLOOD PRESSURE: 84 MMHG | TEMPERATURE: 97.5 F | SYSTOLIC BLOOD PRESSURE: 132 MMHG | WEIGHT: 170 LBS | BODY MASS INDEX: 22.53 KG/M2 | HEIGHT: 73 IN | RESPIRATION RATE: 16 BRPM | OXYGEN SATURATION: 99 %

## 2021-07-22 DIAGNOSIS — M51.9 LUMBAR DISC DISORDER: ICD-10-CM

## 2021-07-22 DIAGNOSIS — M47.812 CERVICAL FACET JOINT SYNDROME: ICD-10-CM

## 2021-07-22 DIAGNOSIS — M47.812 CERVICAL SPONDYLOSIS: ICD-10-CM

## 2021-07-22 DIAGNOSIS — M47.816 LUMBAR SPONDYLOSIS: ICD-10-CM

## 2021-07-22 DIAGNOSIS — M47.816 LUMBAR FACET ARTHROPATHY: ICD-10-CM

## 2021-07-22 DIAGNOSIS — M50.90 CERVICAL DISC DISORDER: Primary | ICD-10-CM

## 2021-07-22 DIAGNOSIS — M48.061 SPINAL STENOSIS OF LUMBAR REGION WITHOUT NEUROGENIC CLAUDICATION: ICD-10-CM

## 2021-07-22 PROCEDURE — G8420 CALC BMI NORM PARAMETERS: HCPCS | Performed by: PAIN MEDICINE

## 2021-07-22 PROCEDURE — 4004F PT TOBACCO SCREEN RCVD TLK: CPT | Performed by: PAIN MEDICINE

## 2021-07-22 PROCEDURE — G8427 DOCREV CUR MEDS BY ELIG CLIN: HCPCS | Performed by: PAIN MEDICINE

## 2021-07-22 PROCEDURE — 99214 OFFICE O/P EST MOD 30 MIN: CPT | Performed by: PAIN MEDICINE

## 2021-07-22 PROCEDURE — 3017F COLORECTAL CA SCREEN DOC REV: CPT | Performed by: PAIN MEDICINE

## 2021-07-22 PROCEDURE — 99213 OFFICE O/P EST LOW 20 MIN: CPT | Performed by: PAIN MEDICINE

## 2021-07-22 NOTE — PROGRESS NOTES
[facility], [address] [place] [address] [place]  [phone]    Follow up Note      Sherren Roux     Date of Visit:  7/22/2021    CC:  Patient presents for follow up   Chief Complaint   Patient presents with    Follow-up    Back Pain     radiates into both hips     Follow Up After Procedure     Fluoroscopic guided cervical epidural steroid injection, #1 at C7-T1 level. HPI:  Office extension, last 05/2019 for neck pain. Today he is complaining of increased low back pain with radiation the right thigh with tingling and numbness in both legs  Appropriate analgesia with current medications regimen: yes . Change in quality of symptoms:no. Medication side effects:none. Recent diagnostic testing:Lumbar spine MRI . Recent interventional procedures:none. .    He has been on anticoagulation medications to include Plavix/ASA. The patient  has not been on herbal supplements. The patient is not diabetic. Imaging:   Lumbar spine MRI 2021:  1. Severe central canal stenoses from L2-3 to L5-1, resulting from   degenerative changes superimposed on a developmentally narrow central canal.   2. Central canal stenosis is most severe at L4-5. 3.  Multilevel neural foraminal stenoses, worst (severe) at the bilateral   L3-4, right L4-5 and bilateral L5-S1 levels. 4. Signal abnormality in the L4-S1 vertebral bodies likely represents   degenerative sclerosis. MRI cervical spine 06/2018  Spondylotic changes and facet arthrosis  Mild degenerative anterior listhesis of T1 on T2  Varying degrees of central stenosis and foraminal narrowing. Note made of mild cord flattening at C5-6 and C6-7.     Previous treatments: Physical Therapy and medications. .         Potential Aberrant Drug-Related Behavior:    No    Urine Drug Screening:  None    OARRS report:  07/2021 consistent    Opioid Agreement:  Date enacted:N/A  Renewal date:N/A    Past Medical History:   Diagnosis Date  Heart attack ([facility] [address] 04/2020    History of Holter monitoring 01/11/2021    Neck pain     Osteoarthritis      Past Surgical History:   Procedure Laterality Date    CARDIAC CATHETERIZATION      CORONARY ANGIOPLASTY WITH STENT PLACEMENT      EPIDURAL STEROID INJECTION Right 6/19/2019    CERVICAL EPIDURAL STEROID INJECTION C7-T1 RIGHT PARAMEDIAN performed by Ramonita Jin MD at [facility]  06/19/2019    cervical epidural steroid injection      Prior to Admission medications    Medication Sig Start Date End Date Taking? Authorizing Provider   DULoxetine (CYMBALTA) 30 MG extended release capsule Take 30 mg by mouth daily   Yes Historical Provider, MD   pravastatin (PRAVACHOL) 40 MG tablet Take 40 mg by mouth daily   Yes Historical Provider, MD   clopidogrel (PLAVIX) 75 MG tablet take 1 tablet by mouth once daily 5/22/19  Yes Historical Provider, MD   metoprolol succinate (TOPROL XL) 25 MG extended release tablet take 1 tablet by mouth once daily 5/22/19  Yes Historical Provider, MD   HYDROcodone-acetaminophen ([facility]) 5-325 MG per tablet take 1 tablet by mouth three times a day 5/22/19  Yes Historical Provider, MD   gabapentin (NEURONTIN) 300 MG capsule take 1 capsule by mouth three times a day 5/22/19  Yes Historical Provider, MD     No Known Allergies    Social History     Socioeconomic History    Marital status:      Spouse name: Not on file    Number of children: Not on file    Years of education: Not on file    Highest education level: Not on file   Occupational History    Not on file   Tobacco Use    Smoking status: Current Every Day Smoker     Packs/day: 0.25     Types: Cigarettes    Smokeless tobacco: Never Used   Substance and Sexual Activity    Alcohol use:  Yes     Alcohol/week: 2.0 standard drinks     Types: 2 Cans of beer per week    Drug use: Never    Sexual activity: Not on file   Other Topics Concern    Not on file   Social History Narrative    Not on file Social Determinants of Health     Financial Resource Strain:     Difficulty of Paying Living Expenses:    Food Insecurity:     Worried About Running Out of Food in the Last Year:     [address] in the Last Year:    Transportation Needs:     Lack of Transportation (Medical):  Lack of Transportation (Non-Medical):    Physical Activity:     Days of Exercise per Week:     Minutes of Exercise per Session:    Stress:     Feeling of Stress :    Social Connections:     Frequency of Communication with Friends and Family:     Frequency of Social Gatherings with Friends and Family:     Attends [employer] Services:     Active Member of Clubs or Organizations:     Attends Club or Organization Meetings:     Marital Status:    Intimate Partner Violence:     Fear of Current or Ex-Partner:     Emotionally Abused:     Physically Abused:     Sexually Abused:      Family History   Problem Relation Age of Onset    No Known Problems Mother     Cancer Father      REVIEW OF SYSTEMS:     Henretta Ripper denies fever/chills, chest pain, shortness of breath, new bowel or bladder complaints. All other review of systems was negative. PHYSICAL EXAMINATION:      /84   Pulse 85   Temp 97.5 °F (36.4 °C) (Infrared)   Resp 16   Ht 6' 1\" (1.854 m)   Wt 170 lb (77.1 kg)   SpO2 99%   BMI 22.43 kg/m²     General:      General appearance:   pleasant and well-hydrated. , in moderate discomfort and A & O x3  Build:Normal Weight    HEENT:    Head:normocephalic and atraumatic  Sclera: icterus absent,    Lungs:    Breathing:Breathing Pattern: regular, no distress    Abdomen:    Shape:non-distended and normal  Tenderness:none    Lumbar spine:    Spine inspection:normal   CVA tenderness:No   Palpation:tenderness paravertebral muscles, facet loading, left, right, positive and tenderness. Range of motion:abnormal moderately Lateral bending, flexion, extension rotation bilateral and is  painful.     Musculoskeletal:    Trigger points in Paraveteral:absent bilaterally  SI joint tenderness:negative right, negative left              RAYMUNDO test:not done right, not done             left  Piriformis tenderness:negative right, negative left  Trochanteric bursa tenderness:negative right, negative left  SLR:negative right, negative left, sitting     Extremities:    Tremors:None bilaterally upper and lower  Range of motion:pain with internal rotation of hips negative. Intact:Yes  Edema:Normal    Neurological:    Sensory:normal to light touch bilateral lower extremities. Motor:                  Right Quadriceps5/5          Left Quadriceps5/5           Right Gastrocnemius5/5    Left Gastrocnemius5/5  Right Ant Tibialis5/5  Left Ant Tibialis5/5  Reflexes:    Right Quadriceps reflex2+  Left Quadriceps reflex0  Right Achilles reflex2+  Left Achilles reflex2+  Gait:normal    Dermatology:    Skin:no unusual rashes and no skin lesions    Impression:    Chronic neck pain with radiation to the Left upper extremity down to the hand  Cervical spine MRI: Varying degrees of central stenosis and foraminal narrowing. Plan:  Office extension. Last seen 05/2019 for neck pain. He is presenting today for his low back pain. Multilevel degenerative spinal stenosis. Reviewed lumbar spine MRI and discussed with the patient. Reviewed neurosurgery notes. Patient had started physical therapy. Discussed treatment options including LESI L3-4, patient agrees. Continue with Norco 5/325 TID/Gabapentin 300 mg TID through    On Plavix and ASA for cardiac stents, will get clearance  OARRS report reviewed 07/2021. Patient encouraged to stay active and to lose weight. Treatment plan discussed with the patient including medications and procedure side effects. We discussed with the patient that combining opioids, benzodiazepines, alcohol, illicit drugs or sleep aids increases the risk of respiratory depression including death.  We discussed that these medications may cause drowsiness, sedation or dizziness and have counseled the patient not to drive or operate machinery. We have discussed that these medications will be prescribed only by one provider. We have discussed with the patient about age related risk factors and have thoroughly discussed the importance of taking these medications as prescribed. The patient verbalizes understanding. osvaldo Franco M.D.

## 2021-07-22 NOTE — PROGRESS NOTES
Do you currently have any of the following:    Fever: No  Headache:  No  Cough: No  Shortness of breath: No  Exposed to anyone with these symptoms: No                                                                                                                Shai Sales presents to the [facility] on 7/22/2021. Shai Forte is complaining of pain back pain that radiates into both hips. The pain is constant. The pain is described as aching, throbbing, shooting and sharp. Pain is rated on his best day at a 7, on his worst day at a 8, and on average at a 7 on the VAS scale. He took his last dose of Norco 1 month. Shai Forte does not have issues with constipation. Any procedures since your last visit: Yes, with relief for 1 week. During that week he was able to use his right arm more. He is not on NSAIDS and  is not on anticoagulation medications to include Plavix and is managed by Dr. Antoinette Castle. Pacemaker or defibrillator: No Physician managing device is . Medication Contract and Consent for Opioid Use Documents Filed     Patient Documents       Type of Document Status Date Received Received By Description     Medication Contract Received 5/30/2019 10:35 AM ANTON SAMPSON                    /84   Pulse 85   Temp 97.5 °F (36.4 °C) (Infrared)   Resp 16   Ht 6' 1\" (1.854 m)   Wt 170 lb (77.1 kg)   SpO2 99%   BMI 22.43 kg/m²      No LMP for male patient.

## 2021-07-26 ENCOUNTER — HOSPITAL ENCOUNTER (OUTPATIENT)
Dept: PHYSICAL THERAPY | Age: 61
Setting detail: THERAPIES SERIES
Discharge: HOME | End: 2021-07-26
Payer: MEDICARE

## 2021-07-26 PROCEDURE — G0283 ELEC STIM OTHER THAN WOUND: HCPCS | Performed by: PHYSICAL THERAPIST

## 2021-07-26 PROCEDURE — 97110 THERAPEUTIC EXERCISES: CPT | Performed by: PHYSICAL THERAPIST

## 2021-07-26 NOTE — PROGRESS NOTES
[facility]  Physical Therapy Treatment Note    Date: 2021  Patient Name: Consuelo Dubose  : 1960   MRN: [id number]     Referring Provider: Chetan Michelle MD  [address] [place] [address]     Medical Diagnosis:Spinal stenosis of lumbosacral region (M48.07 [ICD-10-CM]); Lumbar spondylosis (M47.816 [ICD-10-CM]); Lumbar disc herniation (M51.26 [ICD-10-CM]); Lumbar radiculopathy (M54.16 [ICD-10-CM])  Outcome Measure:  Oswestry= 33%    S: Pt reports compliance with HEP. Reports pain is usually worse in right hip. O: Increased time on Nustep and reps on noted therex. Time 08:01-08:55 2x/wk 6 weeks    Visit      Pain 4/10     ROM      Modalities      MH + ES L/S supine X 15 min                                             THERAPEUTIC ACTIVITIES  ALL EXERCISE DONE WITH DRAW-IN TECHNIQUE Large, functional, dynamic, global movements used to build strength, balance, endurance, and flexibility and to improve physical performance. ROWS: H G X 30 \"    ROWS: M G x 30 \"    ROWS: L  \"    Obliques - high  \"    Obliques - low  \"          THEREX      Nustep   L6 x 6 min     Punches G x 25     Lat pulldowns G x 25     Triceps ext standing G X25     Marching 2# x 2 min           Trunk ext TB      Trunk flex TB G  X 25     Hip abd 2# x 2 min     Hip EXT 2# x 2 min     TG Squats                  A:  Tolerated well.   Feels better with therex  P: Continue with rehab plan  Shaheen Rush PT    Treatment Charges: Mins Units   Initial Evaluation       Re-Evaluation     Ther Exercise         TE  39  3   Manual Therapy     MT     Ther Activities        TA     Gait Training          GT     Neuro Re-education NR     Modalities 15 1   Non-Billable Service Time     Other     Total Time/Units 54  4

## 2021-08-05 ENCOUNTER — HOSPITAL ENCOUNTER (OUTPATIENT)
Dept: PHYSICAL THERAPY | Age: 61
Setting detail: THERAPIES SERIES
Discharge: HOME | End: 2021-08-05
Payer: MEDICARE

## 2021-08-05 PROCEDURE — 97110 THERAPEUTIC EXERCISES: CPT

## 2021-08-05 PROCEDURE — G0283 ELEC STIM OTHER THAN WOUND: HCPCS

## 2021-08-05 PROCEDURE — 97530 THERAPEUTIC ACTIVITIES: CPT

## 2021-08-05 NOTE — PROGRESS NOTES
[facility]  Physical Therapy Treatment Note  Date: 2021  Patient Name: Leon Galicia  : 1960   MRN: [id number]  Referring Provider: Inessa Collins MD  [facility]     Medical Diagnosis:Spinal stenosis of lumbosacral region (M48.07 [ICD-10-CM]); Lumbar spondylosis (M47.816 [ICD-10-CM]); Lumbar disc herniation (M51.26 [ICD-10-CM]); Lumbar radiculopathy (M54.16 [ICD-10-CM])  Outcome Measure:  Oswestry= 33%    S: Pt reports he missed a few sessions, had been taking pain medication and couldn't get up. He talked with his physician and is now taking tylenol, especially the night before therapy. Patient states pain across low back, some days pain into hips. Flexion relieves pain for short periods. O: Added low row, increased time nustep  Time 3168-2026 2x/wk 6 weeks   Visit     Pain 4/10    ROM     Modalities     MH + ES L/S supine X 15 min    THERAPEUTIC ACTIVITIES  ALL EXERCISE DONE WITH DRAW-IN TECHNIQUE Large, functional, dynamic, global movements used to build strength, balance, endurance, and flexibility and to improve physical performance. ROWS: H G X 30 \"   ROWS: M G x 30 \"   ROWS: L G x 30 \"   Obliques - high  \"   Obliques - low  \"   Punches G x 30    THEREX     Nustep   L6 x 8 min         Lat pulldowns G x 30    Triceps ext standing G X 30    Trunk ext TB     Trunk flex TB G  X 30    Hip abd Alt. 2# x 2 min    Hip EXT Alt. 2# x 2 min    Marching 2# x 2 min    TG Squats     A:  Tolerated well. P: Continue with rehab plan.   Kia Choudhury PTA    Treatment Charges: Mins Units   Initial Evaluation       Re-Evaluation     Ther Exercise         TE  30  2   Manual Therapy     MT     Ther Activities        TA 15 1   Gait Training          GT     Neuro Re-education NR     Modalities 15 1   Non-Billable Service Time     Other     Total Time/Units   4

## 2021-08-06 NOTE — PROGRESS NOTES
Patient notified clearance from Dr Soo Wyatt to hold Plavix for 7 days prior to 8/16/2021 procedure had been received.

## 2021-08-10 ENCOUNTER — TELEPHONE (OUTPATIENT)
Dept: PAIN MANAGEMENT | Age: 61
End: 2021-08-10

## 2021-08-10 NOTE — TELEPHONE ENCOUNTER
8/10/2021-upon review of Dorset's chart, it is noted that he takes Plavix . Medical clearance obtained from Dr. Joan ALONSO to hold Plavix  for 7 days. Call to [facility], advised him to hold his Plavix  for 7 days before his 08/16/2021 procedure, last dose to be 08/08/2021. he shows an understanding to not take it before his procedure. Instructed him that the surgery center should call him a few days before for the pre op call and after 3:00 PM the business day before with the arrival time. Hitesh verbalized understanding.      COVID-19 symptom and exposure screening:    Fever: No  Headache:  No  Cough: No  Shortness of breath: No  Exposed to anyone with these symptoms: No     Nina Ross RN  Pain Management

## 2021-08-12 ENCOUNTER — HOSPITAL ENCOUNTER (OUTPATIENT)
Dept: PHYSICAL THERAPY | Age: 61
Setting detail: THERAPIES SERIES
Discharge: HOME | End: 2021-08-12
Payer: MEDICARE

## 2021-08-12 PROCEDURE — 97530 THERAPEUTIC ACTIVITIES: CPT

## 2021-08-12 PROCEDURE — G0283 ELEC STIM OTHER THAN WOUND: HCPCS

## 2021-08-12 PROCEDURE — 97110 THERAPEUTIC EXERCISES: CPT

## 2021-08-12 NOTE — PROGRESS NOTES
[facility]  Physical Therapy Treatment Note  Date: 2021  Patient Name: Chandana Kent  : 1960   MRN: [id number]  Referring Provider: Jaquelin Clements MD  [address] [place] [address]     Medical Diagnosis:Spinal stenosis of lumbosacral region (M48.07 [ICD-10-CM]); Lumbar spondylosis (M47.816 [ICD-10-CM]); Lumbar disc herniation (M51.26 [ICD-10-CM]); Lumbar radiculopathy (M54.16 [ICD-10-CM])  Outcome Measure:  Oswestry= 33%    S: Pt reports feeling good with exercises and treatment. [facility]. O:   Time [id number] 2x/wk 6 weeks   Visit     Pain 4/10    ROM     Modalities     MH + ES L/S supine X 15 min    THERAPEUTIC ACTIVITIES  ALL EXERCISE DONE WITH DRAW-IN TECHNIQUE Large, functional, dynamic, global movements used to build strength, balance, endurance, and flexibility and to improve physical performance. Pull Downs G X 30 \"   ROWS: M G x 30 \"   ROWS: L G x 30 \"   Obliques - high  \"   Obliques - low  \"   Punches G x 30    THEREX     Nustep   L6 x 8 min         Lat pulldowns G x 30    Triceps ext standing G X 30    Trunk ext TB     Trunk flex TB G  X 30    Hip abd Alt. 2# x 2 min    Hip EXT Alt. 2# x 2 min    Marching 2# x 2 min    TG Squats     A:  Tolerated well. P: Continue with rehab plan.   Kadie Bishop PTA    Treatment Charges: Mins Units   Initial Evaluation       Re-Evaluation     Ther Exercise         TE  30  2   Manual Therapy     MT     Ther Activities        TA 15 1   Gait Training          GT     Neuro Re-education NR     Modalities 15 1   Non-Billable Service Time     Other     Total Time/Units 60  4

## 2021-08-16 ENCOUNTER — HOSPITAL ENCOUNTER (OUTPATIENT)
Dept: OPERATING ROOM | Age: 61
Setting detail: OUTPATIENT SURGERY
Discharge: HOME | End: 2021-08-16
Attending: PAIN MEDICINE
Payer: MEDICARE

## 2021-08-16 ENCOUNTER — HOSPITAL ENCOUNTER (OUTPATIENT)
Age: 61
Setting detail: OUTPATIENT SURGERY
Discharge: HOME | End: 2021-08-16
Attending: PAIN MEDICINE | Admitting: PAIN MEDICINE
Payer: MEDICARE

## 2021-08-16 VITALS
SYSTOLIC BLOOD PRESSURE: 130 MMHG | WEIGHT: 170 LBS | BODY MASS INDEX: 22.53 KG/M2 | HEART RATE: 80 BPM | RESPIRATION RATE: 14 BRPM | OXYGEN SATURATION: 99 % | HEIGHT: 73 IN | DIASTOLIC BLOOD PRESSURE: 80 MMHG

## 2021-08-16 DIAGNOSIS — M54.16 LUMBAR RADICULOPATHY: ICD-10-CM

## 2021-08-16 PROCEDURE — 3600000005 HC SURGERY LEVEL 5 BASE: Performed by: PAIN MEDICINE

## 2021-08-16 PROCEDURE — 6360000002 HC RX W HCPCS: Performed by: PAIN MEDICINE

## 2021-08-16 PROCEDURE — 62323 NJX INTERLAMINAR LMBR/SAC: CPT | Performed by: PAIN MEDICINE

## 2021-08-16 PROCEDURE — 6360000004 HC RX CONTRAST MEDICATION: Performed by: PAIN MEDICINE

## 2021-08-16 PROCEDURE — 7100000010 HC PHASE II RECOVERY - FIRST 15 MIN: Performed by: PAIN MEDICINE

## 2021-08-16 PROCEDURE — 2709999900 HC NON-CHARGEABLE SUPPLY: Performed by: PAIN MEDICINE

## 2021-08-16 PROCEDURE — 7100000011 HC PHASE II RECOVERY - ADDTL 15 MIN: Performed by: PAIN MEDICINE

## 2021-08-16 PROCEDURE — 3209999900 FLUORO FOR SURGICAL PROCEDURES

## 2021-08-16 PROCEDURE — 2500000003 HC RX 250 WO HCPCS: Performed by: PAIN MEDICINE

## 2021-08-16 RX ORDER — LIDOCAINE HYDROCHLORIDE 5 MG/ML
INJECTION, SOLUTION INFILTRATION; INTRAVENOUS PRN
Status: DISCONTINUED | OUTPATIENT
Start: 2021-08-16 | End: 2021-08-16 | Stop reason: ALTCHOICE

## 2021-08-16 ASSESSMENT — PAIN DESCRIPTION - DESCRIPTORS: DESCRIPTORS: ACHING;CONSTANT

## 2021-08-16 ASSESSMENT — PAIN - FUNCTIONAL ASSESSMENT
PAIN_FUNCTIONAL_ASSESSMENT: 0-10
PAIN_FUNCTIONAL_ASSESSMENT: PREVENTS OR INTERFERES SOME ACTIVE ACTIVITIES AND ADLS

## 2021-08-16 ASSESSMENT — PAIN SCALES - GENERAL
PAINLEVEL_OUTOF10: 0
PAINLEVEL_OUTOF10: 0

## 2021-08-16 NOTE — OP NOTE
2021    Patient: Sai Monroe  :  1960  Age:  59 y.o. Sex:  male     PRE-OPERATIVE DIAGNOSIS: Lumbar disc displacement, lumbar radiculopathy. POST-OPERATIVE DIAGNOSIS: Same. PROCEDURE: Fluoroscopic guided therapeutic lumbar epidural steroid injection at the L3-4 level (# 1). SURGEON: M. Nelwyn Rinne, M.D.. ANESTHESIA: Local    ESTIMATED BLOOD LOSS: None.  ______________________________________________________________________    BRIEF HISTORY:  Sai Monroe comes in today for first lumbar epidural injection at L3-4 level. The potential complications of this procedure were discussed with him again today. He has elected to undergo the aforementioned procedure. [facility] complete History & Physical examination were reviewed in depth, a copy of which is in the chart. DESCRIPTION OF PROCEDURE:    After confirming written and informed consent, a time-out was performed and [facility] name and date of birth, the procedure to be performed as well as the plan for the location of the needle insertion were confirmed. The patient was brought into the procedure room and placed in the prone position on the fluoroscopy table. A pillow was placed under the patient's lower abdomen/upper pelvis to increase lumbar interlaminar space. Standard monitors were placed, and vital signs were observed throughout the procedure. The area of the lumbar spine was prepped with chloraprep and draped in a sterile manner. The L3-4 interspace was identified and marked under AP fluoroscopy. The skin and subcutaneous tissues at the above level were anesthestized with 0.5% lidocaine. With intermittent fluoroscopy, an # 18 gauge 3 1/2 tuohy epidural needle was inserted and directed toward the interlaminar space. The needle was slowly advanced using loss of resistance technique and 5 cc glass syringe  until the tip of the epidural needle has passed through the ligamentum flavum and entered the epidural space.  AP and lateral fluoroscopic imaging is performed to verify that the epidural needle is properly placed. Negative aspiration of blood and CSF was confirmed. 0.5 ml of omnipaque 240 was used for confirmation of even epidural spread under both live and AP fluoroscopy. After negative aspiration, a solution of 0.5 % Lidocaine 1 ml and 80 mg DepoMedrol was easily injected. The needle was gently removed intact . The patient back was cleaned and a Band-Aid was placed over the needle insertion point. Disposition the patient tolerated the procedure well and there were no complications . Vital signs remained stable throughout the procedure. The patient was escorted to the recovery area where they remained until discharge and written discharge instructions for the procedure were given. Plan: Sarah Douglas will return to our pain management center as scheduled.      Queen Keyla MD

## 2021-08-16 NOTE — H&P
status: Current Every Day Smoker     Packs/day: 0.25     Types: Cigarettes    Smokeless tobacco: Never Used   Substance and Sexual Activity    Alcohol use: Yes     Alcohol/week: 2.0 standard drinks     Types: 2 Cans of beer per week    Drug use: Never    Sexual activity: Not on file   Other Topics Concern    Not on file   Social History Narrative    Not on file     Social Determinants of Health     Financial Resource Strain:     Difficulty of Paying Living Expenses:    Food Insecurity:     Worried About Running Out of Food in the Last Year:     [address] in the Last Year:    Transportation Needs:     Lack of Transportation (Medical):  Lack of Transportation (Non-Medical):    Physical Activity:     Days of Exercise per Week:     Minutes of Exercise per Session:    Stress:     Feeling of Stress :    Social Connections:     Frequency of Communication with Friends and Family:     Frequency of Social Gatherings with Friends and Family:     Attends Protestant Services:     Active Member of Clubs or Organizations:     Attends Club or Organization Meetings:     Marital Status:    Intimate Partner Violence:     Fear of Current or Ex-Partner:     Emotionally Abused:     Physically Abused:     Sexually Abused:        Family History   Problem Relation Age of Onset    No Known Problems Mother     Cancer Father          REVIEW OF SYSTEMS:    CONSTITUTIONAL:  negative for  fevers, chills, sweats and fatigue    RESPIRATORY:  negative for  dry cough, cough with sputum, dyspnea, wheezing and chest pain    CARDIOVASCULAR:  negative for chest pain, dyspnea, palpitations, syncope    GASTROINTESTINAL:  negative for nausea, vomiting, change in bowel habits, diarrhea, constipation and abdominal pain    MUSCULOSKELETAL: negative for muscle weakness    SKIN: negative for itching or rashes.     BEHAVIOR/PSYCH:  negative for poor appetite, increased appetite, decreased sleep and poor concentration    All other systems negative      PHYSICAL EXAM:    VITALS:  /67   Pulse 68   Resp 16   Ht 6' 1\" (1.854 m)   Wt 170 lb (77.1 kg)   SpO2 99%   BMI 22.43 kg/m²     CONSTITUTIONAL:  awake, alert, cooperative, no apparent distress, and appears stated age    EYES: PERRLA, EOMI    LUNGS:  No increased work of breathing, no audible wheezing    CARDIOVASCULAR:  regular rate and rhythm    ABDOMEN:  Soft non tender non distended     EXTREMITIES: no signs of clubbing or cyanosis. MUSCULOSKELETAL: negative for flaccid muscle tone or spastic movements. SKIN: gross examination reveals no signs of rashes, or diaphoresis. NEURO: Cranial nerves II-XII grossly intact. No signs of agitated mood. Assessment/Plan:    Low back and leg pain for LESI L3-4.

## 2021-08-17 ENCOUNTER — HOSPITAL ENCOUNTER (OUTPATIENT)
Dept: PHYSICAL THERAPY | Age: 61
Setting detail: THERAPIES SERIES
Discharge: HOME | End: 2021-08-17
Payer: MEDICARE

## 2021-08-17 PROCEDURE — 97110 THERAPEUTIC EXERCISES: CPT

## 2021-08-17 PROCEDURE — G0283 ELEC STIM OTHER THAN WOUND: HCPCS

## 2021-08-17 PROCEDURE — 97530 THERAPEUTIC ACTIVITIES: CPT

## 2021-08-17 NOTE — PROGRESS NOTES
[facility]  Physical Therapy Treatment Note  Date: 2021  Patient Name: Grey Posey  : 1960   MRN: [id number]  Referring Provider: Selene Cameron MD  [address] [place] [address]     Medical Diagnosis:Spinal stenosis of lumbosacral region (M48.07 [ICD-10-CM]); Lumbar spondylosis (M47.816 [ICD-10-CM]); Lumbar disc herniation (M51.26 [ICD-10-CM]); Lumbar radiculopathy (M54.16 [ICD-10-CM])  Outcome Measure:  Oswestry= 33%    S: Pt reports feeling good with exercises and treatment. [facility], had steroid epidurals. Patient reports feeling good today, pressure thru hips, pain more central.  O:   Time [id number] 2x/wk 6 weeks   Visit     Pain 4/10    ROM     Modalities     MH + ES L/S supine X 15 min    THERAPEUTIC ACTIVITIES  ALL EXERCISE DONE WITH DRAW-IN TECHNIQUE Large, functional, dynamic, global movements used to build strength, balance, endurance, and flexibility and to improve physical performance. Pull Downs G X 30 \"   ROWS: M G x 30 \"   ROWS: L G x 30 \"   Obliques - high  \"   Obliques - low  \"   Punches G x 30    THEREX     Nustep   L5 x 8 min, seat #10         Lat pulldowns G x 30    Triceps ext standing G X 30    Trunk ext TB     Trunk flex TB G  X 30    Hip abd Alt. 2# x 2 min    Hip EXT Alt. 2# x 2 min    Marching 2# x 2 min    TG Squats     A:  Tolerated well. P: Continue with rehab plan.   Margarita Morrison PTA    Treatment Charges: Mins Units   Initial Evaluation       Re-Evaluation     Ther Exercise         TE  30  2   Manual Therapy     MT     Ther Activities        TA 15 1   Gait Training          GT     Neuro Re-education NR     Modalities 15 1   Non-Billable Service Time     Other     Total Time/Units 60  4

## 2021-08-19 ENCOUNTER — HOSPITAL ENCOUNTER (OUTPATIENT)
Dept: PHYSICAL THERAPY | Age: 61
Setting detail: THERAPIES SERIES
Discharge: HOME | End: 2021-08-19
Payer: MEDICARE

## 2021-08-19 PROCEDURE — 97530 THERAPEUTIC ACTIVITIES: CPT | Performed by: PHYSICAL THERAPIST

## 2021-08-19 PROCEDURE — G0283 ELEC STIM OTHER THAN WOUND: HCPCS | Performed by: PHYSICAL THERAPIST

## 2021-08-19 PROCEDURE — 97110 THERAPEUTIC EXERCISES: CPT | Performed by: PHYSICAL THERAPIST

## 2021-08-24 ENCOUNTER — HOSPITAL ENCOUNTER (OUTPATIENT)
Dept: PHYSICAL THERAPY | Age: 61
Setting detail: THERAPIES SERIES
Discharge: HOME | End: 2021-08-24
Payer: MEDICARE

## 2021-08-24 PROCEDURE — G0283 ELEC STIM OTHER THAN WOUND: HCPCS

## 2021-08-24 PROCEDURE — 97110 THERAPEUTIC EXERCISES: CPT

## 2021-08-24 PROCEDURE — 97530 THERAPEUTIC ACTIVITIES: CPT

## 2021-08-24 NOTE — PROGRESS NOTES
[facility]  Physical Therapy Treatment Note  Date: 2021  Patient Name: Harish Bernard  : 1960   MRN: [id number]  Referring Provider: Khoa Nova MD  [facility]     Medical Diagnosis:Spinal stenosis of lumbosacral region (M48.07 [ICD-10-CM]); Lumbar spondylosis (M47.816 [ICD-10-CM]); Lumbar disc herniation (M51.26 [ICD-10-CM]); Lumbar radiculopathy (M54.16 [ICD-10-CM])  Outcome Measure:  Oswestry= 33%    S: Pt reports having to fix waterline Saturday putting him in awkward position to reach. Next day he had N/T right leg from hip to knee. Symptoms did ease yesterday and today is feeling ok. O:   Time [id number] 2x/wk 6 weeks   Visit     Pain 4/10    ROM     Modalities     MH + ES L/S supine X 15 min    THERAPEUTIC ACTIVITIES  ALL EXERCISE DONE WITH DRAW-IN TECHNIQUE Large, functional, dynamic, global movements used to build strength, balance, endurance, and flexibility and to improve physical performance. Pull Downs G X 30 \"   ROWS: M G x 30 \"   ROWS: L G x 30 \"   Obliques - high  \"   Obliques - low  \"   Punches G x 30    THEREX     Nustep   L6 x 8 min, seat #10         Lat pulldowns G x 30    Triceps ext standing G X 30    Trunk ext TB G x 30    Trunk flex TB G  X 30    Hip abd Alt. 2# x 2 min    Hip EXT Alt. 2# x 2 min    Marching 2# x 2 min    TG Squats     A:  Tolerated well. P: Continue with rehab plan.   Praveena Zavala, LAURA    Treatment Charges: Mins Units   Initial Evaluation       Re-Evaluation     Ther Exercise         TE  30  2   Manual Therapy     MT     Ther Activities        TA 15 1   Gait Training          GT     Neuro Re-education NR     Modalities 15 1   Non-Billable Service Time     Other     Total Time/Units 60  4

## 2021-08-25 ENCOUNTER — OFFICE VISIT (OUTPATIENT)
Dept: PAIN MANAGEMENT | Age: 61
End: 2021-08-25
Payer: MEDICARE

## 2021-08-25 VITALS
TEMPERATURE: 97.5 F | HEART RATE: 72 BPM | BODY MASS INDEX: 22.53 KG/M2 | SYSTOLIC BLOOD PRESSURE: 124 MMHG | HEIGHT: 73 IN | RESPIRATION RATE: 16 BRPM | DIASTOLIC BLOOD PRESSURE: 80 MMHG | WEIGHT: 170 LBS

## 2021-08-25 DIAGNOSIS — M50.90 CERVICAL DISC DISORDER: ICD-10-CM

## 2021-08-25 DIAGNOSIS — M47.812 CERVICAL FACET JOINT SYNDROME: ICD-10-CM

## 2021-08-25 DIAGNOSIS — M48.061 SPINAL STENOSIS OF LUMBAR REGION WITHOUT NEUROGENIC CLAUDICATION: ICD-10-CM

## 2021-08-25 DIAGNOSIS — M47.816 LUMBAR FACET ARTHROPATHY: ICD-10-CM

## 2021-08-25 DIAGNOSIS — G89.4 CHRONIC PAIN SYNDROME: ICD-10-CM

## 2021-08-25 DIAGNOSIS — M47.812 CERVICAL SPONDYLOSIS: ICD-10-CM

## 2021-08-25 DIAGNOSIS — M51.9 LUMBAR DISC DISORDER: Primary | ICD-10-CM

## 2021-08-25 DIAGNOSIS — M47.816 LUMBAR SPONDYLOSIS: ICD-10-CM

## 2021-08-25 PROCEDURE — 99213 OFFICE O/P EST LOW 20 MIN: CPT | Performed by: PAIN MEDICINE

## 2021-08-25 PROCEDURE — 4004F PT TOBACCO SCREEN RCVD TLK: CPT | Performed by: PAIN MEDICINE

## 2021-08-25 PROCEDURE — 3017F COLORECTAL CA SCREEN DOC REV: CPT | Performed by: PAIN MEDICINE

## 2021-08-25 PROCEDURE — G8420 CALC BMI NORM PARAMETERS: HCPCS | Performed by: PAIN MEDICINE

## 2021-08-25 PROCEDURE — G8427 DOCREV CUR MEDS BY ELIG CLIN: HCPCS | Performed by: PAIN MEDICINE

## 2021-08-25 NOTE — PROGRESS NOTES
Do you currently have any of the following:    Fever: No  Headache:  No  Cough: No  Shortness of breath: No  Exposed to anyone with these symptoms: No                                                                                                                Unknown [place] HEAVEN Gutierrez presents to the [facility] on 8/25/2021. Unknown [place] is complaining of pain in his low back. The pain is constant. The pain is described as shooting and burning yamile his right leg, burns on the top of his foot, pain in his hip sharp. . Pain is rated on his best day at a 6, on his worst day at a 10, and on average at a 8 on the VAS scale. He took his last dose of Norco 2 months ago. Chasidy Lazo does not have issues with constipation. Any procedures since your last visit: No    He is not on NSAIDS and  is  on anticoagulation medications to include Plavix and is managed by Dr Toby Alvarado. . has stents April 2020. Pacemaker or defibrillator: No.    Medication Contract and Consent for Opioid Use Documents Filed     Patient Documents       Type of Document Status Date Received Received By Description     Medication Contract Received 5/30/2019 10:35 AM ANTON SAMPSON                    /80   Pulse 72   Temp 97.5 °F (36.4 °C) (Infrared)   Resp 16   Ht 6' 1\" (1.854 m)   Wt 170 lb (77.1 kg)   BMI 22.43 kg/m²      No LMP for male patient.

## 2021-08-25 NOTE — PROGRESS NOTES
[facility]  [address] [facility], [address] [place] [address]  [phone]    Follow up Note      Elida Alves     Date of Visit:  8/25/2021    CC:  Patient presents for follow up   Chief Complaint   Patient presents with    Follow-up    Back Pain       HPI:  Follow up on his low back pain with no acute issues, patient mentioned that his pain is better since last office visit. Appropriate analgesia with current medications regimen: yes . Change in quality of symptoms:no. Medication side effects:none. Recent diagnostic testing:none  Recent interventional procedures:LESI L3-4 excellent    He has been on anticoagulation medications to include Plavix/ASA. The patient  has not been on herbal supplements. The patient is not diabetic. Imaging:   Lumbar spine MRI 2021:  1. Severe central canal stenoses from L2-3 to L5-1, resulting from   degenerative changes superimposed on a developmentally narrow central canal.   2. Central canal stenosis is most severe at L4-5. 3.  Multilevel neural foraminal stenoses, worst (severe) at the bilateral   L3-4, right L4-5 and bilateral L5-S1 levels. 4. Signal abnormality in the L4-S1 vertebral bodies likely represents   degenerative sclerosis. MRI cervical spine 06/2018  Spondylotic changes and facet arthrosis  Mild degenerative anterior listhesis of T1 on T2  Varying degrees of central stenosis and foraminal narrowing. Note made of mild cord flattening at C5-6 and C6-7.     Previous treatments: Physical Therapy and medications. .         Potential Aberrant Drug-Related Behavior:    No    Urine Drug Screening:  None    OARRS report:  08/2021 consistent    Opioid Agreement:  Date enacted:N/A  Renewal date:N/A    Past Medical History:   Diagnosis Date    Heart attack ([facility] [address] 04/2020    History of Holter monitoring 01/11/2021    Neck pain     Osteoarthritis      Past Surgical History:   Procedure Laterality Date    CARDIAC CATHETERIZATION  CORONARY ANGIOPLASTY WITH STENT PLACEMENT      EPIDURAL STEROID INJECTION Right 6/19/2019    CERVICAL EPIDURAL STEROID INJECTION C7-T1 RIGHT PARAMEDIAN [facility]  06/19/2019    cervical epidural steroid injection     PAIN MANAGEMENT PROCEDURE N/A 8/16/2021    LUMBAR EPIDURAL STEROID INJECTION L3-4 WITH 80 DEPO AND LOW VOLUME performed by Vee Hong MD at [address] [place] [address]     Prior to Admission medications    Medication Sig Start Date End Date Taking? Authorizing Provider   DULoxetine (CYMBALTA) 30 MG extended release capsule Take 30 mg by mouth daily   Yes Historical Provider, MD   pravastatin (PRAVACHOL) 40 MG tablet Take 40 mg by mouth daily   Yes Historical Provider, MD   clopidogrel (PLAVIX) 75 MG tablet take 1 tablet by mouth once daily 5/22/19  Yes Historical Provider, MD   metoprolol succinate (TOPROL XL) 25 MG extended release tablet take 1 tablet by mouth once daily 5/22/19  Yes Historical Provider, MD   HYDROcodone-acetaminophen ([facility]) 5-325 MG per tablet take 1 tablet by mouth three times a day 5/22/19  Yes Historical Provider, MD   gabapentin (NEURONTIN) 300 MG capsule take 1 capsule by mouth three times a day 5/22/19  Yes Historical Provider, MD     No Known Allergies    Social History     Socioeconomic History    Marital status:      Spouse name: Not on file    Number of children: Not on file    Years of education: Not on file    Highest education level: Not on file   Occupational History    Not on file   Tobacco Use    Smoking status: Current Every Day Smoker     Packs/day: 0.25     Types: Cigarettes     Start date: 8/25/1973    Smokeless tobacco: Never Used   Vaping Use    Vaping Use: Never used   Substance and Sexual Activity    Alcohol use:  Yes     Alcohol/week: 2.0 standard drinks     Types: 2 Cans of beer per week    Drug use: Never    Sexual activity: Not Currently   Other Topics Concern    Not on file Social History Narrative    Not on file     Social Determinants of Health     Financial Resource Strain:     Difficulty of Paying Living Expenses:    Food Insecurity:     Worried About Running Out of Food in the Last Year:     [address] in the Last Year:    Transportation Needs:     Lack of Transportation (Medical):  Lack of Transportation (Non-Medical):    Physical Activity:     Days of Exercise per Week:     Minutes of Exercise per Session:    Stress:     Feeling of Stress :    Social Connections:     Frequency of Communication with Friends and Family:     Frequency of Social Gatherings with Friends and Family:     Attends Rastafarian Services:     Active Member of Clubs or Organizations:     Attends Club or Organization Meetings:     Marital Status:    Intimate Partner Violence:     Fear of Current or Ex-Partner:     Emotionally Abused:     Physically Abused:     Sexually Abused:      Family History   Problem Relation Age of Onset    No Known Problems Mother     Cancer Father      REVIEW OF SYSTEMS:     Nicky Shelton denies fever/chills, chest pain, shortness of breath, new bowel or bladder complaints. All other review of systems was negative. PHYSICAL EXAMINATION:      /80   Pulse 72   Temp 97.5 °F (36.4 °C) (Infrared)   Resp 16   Ht 6' 1\" (1.854 m)   Wt 170 lb (77.1 kg)   BMI 22.43 kg/m²     General:      General appearance:   pleasant and well-hydrated. , in mild discomfort and A & O x3  Build:Normal Weight    HEENT:    Head:normocephalic and atraumatic  Sclera: icterus absent,    Lungs:    Breathing:Breathing Pattern: regular, no distress    Abdomen:    Shape:non-distended and normal  Tenderness:none    Lumbar spine:    Spine inspection:normal   CVA tenderness:No   Palpation:tenderness paravertebral muscles, facet loading, left, right, positive and tenderness.   Range of motion:abnormal moderately Lateral bending, flexion, extension rotation bilateral and is painful. Musculoskeletal:    Trigger points in Paraveteral:absent bilaterally  SI joint tenderness:negative right, negative left              RAYMUNDO test:not done right, not done             left  Piriformis tenderness:negative right, negative left  Trochanteric bursa tenderness:negative right, negative left  SLR:negative right, negative left, sitting     Extremities:    Tremors:None bilaterally upper and lower  Range of motion:pain with internal rotation of hips negative. Intact:Yes  Edema:Normal    Neurological:    Sensory:normal to light touch bilateral lower extremities. Motor:                  Right Quadriceps5/5          Left Quadriceps5/5           Right Gastrocnemius5/5    Left Gastrocnemius5/5  Right Ant Tibialis5/5  Left Ant Tibialis5/5  Reflexes:    Right Quadriceps reflex2+  Left Quadriceps reflex0  Right Achilles reflex2+  Left Achilles reflex2+  Gait:normal    Dermatology:    Skin:no unusual rashes and no skin lesions    Impression:    Chronic neck pain with radiation to the Left upper extremity down to the hand  Cervical spine MRI: Varying degrees of central stenosis and foraminal narrowing. Plan:  Follow up on his low back pain with no acute issues. Patient is s/p LESI L3-4, with good relief, will repeat as needed. Continue with Norco 5/325 TID/Gabapentin 300 mg TID through [address] report reviewed 08/2021. Patient encouraged to stay active, currently in physical therapy. Treatment plan discussed with the patient including medications side effects. We discussed with the patient that combining opioids, benzodiazepines, alcohol, illicit drugs or sleep aids increases the risk of respiratory depression including death. We discussed that these medications may cause drowsiness, sedation or dizziness and have counseled the patient not to drive or operate machinery. We have discussed that these medications will be prescribed only by one provider.  We have discussed with the patient about age related risk factors and have thoroughly discussed the importance of taking these medications as prescribed. The patient verbalizes understanding. lamonteing christopher Anaya M.D.

## 2021-09-02 ENCOUNTER — HOSPITAL ENCOUNTER (OUTPATIENT)
Dept: PHYSICAL THERAPY | Age: 61
Setting detail: THERAPIES SERIES
Discharge: HOME | End: 2021-09-02
Payer: MEDICARE

## 2021-09-02 PROCEDURE — 97530 THERAPEUTIC ACTIVITIES: CPT | Performed by: PHYSICAL THERAPIST

## 2021-09-02 PROCEDURE — 97110 THERAPEUTIC EXERCISES: CPT | Performed by: PHYSICAL THERAPIST

## 2021-09-02 PROCEDURE — G0283 ELEC STIM OTHER THAN WOUND: HCPCS | Performed by: PHYSICAL THERAPIST

## 2021-09-02 NOTE — PROGRESS NOTES
[facility]  Physical Therapy Treatment Note  Date: 2021  Patient Name: Teri Tierney  : 1960   MRN: [id number]  Referring Provider: Andrea Gandhi MD  [address] [place] [address]     Medical Diagnosis:Spinal stenosis of lumbosacral region (M48.07 [ICD-10-CM]); Lumbar spondylosis (M47.816 [ICD-10-CM]); Lumbar disc herniation (M51.26 [ICD-10-CM]); Lumbar radiculopathy (M54.16 [ICD-10-CM])  Outcome Measure:  Oswestry= 33%    S: Pt reports no new problems. O:   Time 09:25-10:28 2x/wk 6 weeks   Visit 10/12    Pain 4/10    ROM     Modalities     MH + ES L/S supine X 15 min    THERAPEUTIC ACTIVITIES  ALL EXERCISE DONE WITH DRAW-IN TECHNIQUE Large, functional, dynamic, global movements used to build strength, balance, endurance, and flexibility and to improve physical performance. Pull Downs G X 30 \"   ROWS: M G x 30 \"   ROWS: L G x 30 \"   Obliques - high  \"   Obliques - low  \"   Punches G x 30    THEREX     Nustep   L6 x 8 min, seat #10         Lat pulldowns G x 30    Triceps ext standing G X 30    Trunk ext TB G x 30    Trunk flex TB G  X 30    Hip abd Alt. 2# x 2 min    Hip EXT Alt. 2# x 2 min    Marching 2# x 2 min    TG Squats     A:  Tolerated well. P: Continue with rehab plan.   Jalyn Lares PT    Treatment Charges: Mins Units   Initial Evaluation       Re-Evaluation     Ther Exercise         TE  30  2   Manual Therapy     MT     Ther Activities        TA 15 1   Gait Training          GT     Neuro Re-education NR     Modalities 15 1   Non-Billable Service Time     Other     Total Time/Units 60  4

## 2021-09-15 ENCOUNTER — HOSPITAL ENCOUNTER (OUTPATIENT)
Dept: PHYSICAL THERAPY | Age: 61
Setting detail: THERAPIES SERIES
Discharge: HOME | End: 2021-09-15
Payer: MEDICARE

## 2021-09-15 NOTE — PROGRESS NOTES
Physical Therapy Progress Note    Date: 9/15/2021  Patient Name: Thom Barrios  : 1960   MRN: [id number]    Pt called to cancel PT appt. today    Cordelia Console, PT

## 2021-09-28 NOTE — PROGRESS NOTES
Physical Therapy Progress Note    Date: 2021  Patient Name: Salud Steiner  : 1960   MRN: [id number]    Pt called to cancel PT appt. today    Katia Chiang PT

## 2021-09-29 ENCOUNTER — HOSPITAL ENCOUNTER (OUTPATIENT)
Dept: PHYSICAL THERAPY | Age: 61
Setting detail: THERAPIES SERIES
Discharge: HOME | End: 2021-09-29
Payer: MEDICARE

## 2021-11-10 ENCOUNTER — HOSPITAL ENCOUNTER (EMERGENCY)
Age: 61
Discharge: HOME | End: 2021-11-10
Attending: EMERGENCY MEDICINE
Payer: MEDICARE

## 2021-11-10 VITALS
RESPIRATION RATE: 16 BRPM | OXYGEN SATURATION: 99 % | DIASTOLIC BLOOD PRESSURE: 92 MMHG | TEMPERATURE: 97.5 F | WEIGHT: 167 LBS | SYSTOLIC BLOOD PRESSURE: 147 MMHG | HEART RATE: 88 BPM | BODY MASS INDEX: 22.03 KG/M2

## 2021-11-10 DIAGNOSIS — S61.431A PUNCTURE WOUND OF RIGHT HAND WITHOUT FOREIGN BODY, INITIAL ENCOUNTER: Primary | ICD-10-CM

## 2021-11-10 PROCEDURE — 99283 EMERGENCY DEPT VISIT LOW MDM: CPT

## 2021-11-10 PROCEDURE — 90714 TD VACC NO PRESV 7 YRS+ IM: CPT | Performed by: EMERGENCY MEDICINE

## 2021-11-10 PROCEDURE — 90471 IMMUNIZATION ADMIN: CPT | Performed by: EMERGENCY MEDICINE

## 2021-11-10 PROCEDURE — 6360000002 HC RX W HCPCS: Performed by: EMERGENCY MEDICINE

## 2021-11-10 RX ORDER — TETANUS AND DIPHTHERIA TOXOIDS ADSORBED 2; 2 [LF]/.5ML; [LF]/.5ML
0.5 INJECTION INTRAMUSCULAR ONCE
Status: COMPLETED | OUTPATIENT
Start: 2021-11-10 | End: 2021-11-10

## 2021-11-10 RX ORDER — AMOXICILLIN AND CLAVULANATE POTASSIUM 875; 125 MG/1; MG/1
1 TABLET, FILM COATED ORAL 2 TIMES DAILY
Qty: 20 TABLET | Refills: 0 | Status: SHIPPED | OUTPATIENT
Start: 2021-11-10 | End: 2021-11-20

## 2021-11-10 RX ADMIN — TETANUS AND DIPHTHERIA TOXOIDS ADSORBED 0.5 ML: 2; 2 INJECTION INTRAMUSCULAR at 15:33

## 2021-11-10 ASSESSMENT — ENCOUNTER SYMPTOMS
VOMITING: 0
SHORTNESS OF BREATH: 0
SORE THROAT: 0
COUGH: 0
DIARRHEA: 0
EYE DISCHARGE: 0
SINUS PRESSURE: 0
EYE REDNESS: 0
EYE PAIN: 0
BACK PAIN: 0
WHEEZING: 0
NAUSEA: 0
ABDOMINAL PAIN: 0
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
## 2022-03-18 NOTE — PROGRESS NOTES
instability. Degenerative spondylotic changes with altered alignment as noted. Past Medical History:  Past Medical History:   Diagnosis Date    Heart attack ([address] 04/2020    History of Holter monitoring 01/11/2021    Neck pain     Osteoarthritis      Past Surgical History:   Procedure Laterality Date    CARDIAC CATHETERIZATION      CORONARY ANGIOPLASTY WITH STENT PLACEMENT      EPIDURAL STEROID INJECTION Right 6/19/2019    CERVICAL EPIDURAL STEROID INJECTION C7-T1 RIGHT PARAMEDIAN performed by Cliff Figueroa MD at [facility]  06/19/2019    cervical epidural steroid injection     PAIN MANAGEMENT PROCEDURE N/A 8/16/2021    LUMBAR EPIDURAL STEROID INJECTION L3-4 WITH 80 DEPO AND LOW VOLUME performed by Cliff Figueroa MD at [address] [place] [address]       Medications:   Current Outpatient Medications   Medication Sig Dispense Refill    DULoxetine (CYMBALTA) 30 MG extended release capsule Take 30 mg by mouth daily      pravastatin (PRAVACHOL) 40 MG tablet Take 40 mg by mouth daily      clopidogrel (PLAVIX) 75 MG tablet take 1 tablet by mouth once daily  0    metoprolol succinate (TOPROL XL) 25 MG extended release tablet take 1 tablet by mouth once daily  0    HYDROcodone-acetaminophen (NORCO) 5-325 MG per tablet take 1 tablet by mouth three times a day  0    gabapentin (NEURONTIN) 300 MG capsule take 1 capsule by mouth three times a day  0     No current facility-administered medications for this encounter.        Occupation:     Exercise regimen: Theraband exercise for cerv problems    Hobbies: fishing ,     Patient Goals: pain relief    Contraindications/Precautions: none    OBJECTIVE:     Inspection:  Standing    Cervical: Forward Head   [x] Normal   []Mild   [] Moderate   []Severe      Thoracic:         [x] Normal   [] Increased Kyphosis  [] Decreased Kyphosis    Lumbar:   [x] Normal   [] Increased Lordosis   [] Decreased Lordosis           Observations: well carrying  5. Oswestry Low Back Disability Questionnaire 10% disability    Long Term goals (6-8 weeks)  1. Decrease reported pain to 0-1/10  2. Increase ROM to WNL  3. Increase Strength to WNL   4. Able to perform/complete the following functions/tasks: walking, standing, lifting, carrying   5. Oswestry Low Back Disability Questionnaire5% disability   6. Independent with Home Exercise Programs    Rehab Potential: [x] Good  [] Fair  [] Poor    PLAN     LBP- trunk fexion/extension therex with lumbar stabilization exercise. MH and ES to help with pain. Specific Provider Orders: Eval and treat    Physical therapy plan of care is established based on physician order, patient diagnosis and clinical assessment. Treatment Plan:  [x] Therapeutic Exercise  [x] Therapeutic Activity  [x] Neuromuscular Re-education   [] Gait Training  [] Balance Training  [] Aerobic conditioning  [] Manual Therapy  [] Massage/Fascial release   [] Work/Sport specific activities    [] Pain Neuroscience [x] Cold/hotpack  [] Vasocompression  [x] Electrical Stimulation  [] Lumbar/Cervical Traction  [] Ultrasound   [] Iontophoresis: 4 mg/mL Dexamethasone Sodium Phosphate 40-80 mAmin        [x] Instruction in HEP      []  Medication allergies reviewed for use of Dexamethasone Sodium Phosphate 4mg/ml  with iontophoresis treatments. Patient is not allergic. The following CPT codes are likely to be used in the care of this patient: 57691 PT Evaluation: Moderate Complexity , 98643 Therapeutic Exercise , 49581 Therapeutic Activities ,  Electrical Stimulation      Suggested Professional Referral: [x] No  [] Yes:     Patient Education:  [x] Plans/Goals, Risks/Benefits discussed  [x] Home exercise program  Method of Education: [x] Verbal  [x] Demo  [x] Written  Comprehension of Education:  [x] Verbalizes understanding. [x] Demonstrates understanding. [] Needs Review.   [] Demonstrates/verbalizes understanding of HEP/Ed previously given.    Frequency:  1  days per week for 6-8 weeks    Patient understands diagnosis/prognosis and consents to treatment, plan and goals: [x] Yes    [] No     Thank you for the opportunity to work with your patient. If you have questions or comments, please contact me at [phone] fax: [phone]    Electronically signed by: Tevin Odonnell, PT         Please sign Physician's Certification and return to:  [address] [place] [address], [address] [place] [id number]  Dept: [phone]  Dept Fax: [phone]  Loc: [phone] Certification / Comments     Frequency/Duration1 days per week for 6-8  weeks. Certification period from 3/21/2022  To 6/21/2022    I have reviewed the Plan of Care established for skilled therapy services and certify that the services are required and that they will be provided while the patient is under my care.     Physician's Comments/Revisions:               Physician's Printed Name:                                           [de-identified] Signature:                                                               Date:

## 2022-03-18 NOTE — PROGRESS NOTES
[facility]  Physical Therapy Treatment Note    Date: 3/18/2022  Patient Name: Cedric Gomes  : 1960   MRN: [id number]     Referring Provider: Bryce Manzano DO  [address] [facility],  [address] [place] [address]     Medical Diagnosis:Spinal stenosis of lumbosacral region (M48.07 [ICD-10-CM]); Lumbar spondylosis (M47.816 [ICD-10-CM]); Lumbar disc herniation (M51.26 [ICD-10-CM]); Lumbar radiculopathy (M54.16 [ICD-10-CM])  Outcome Measure:  Oswestry= 33%    S: See eval  O: Pt given written HEP  Time 925-:59 1x/wk 6-8 weeks    Visit -     Pain /10     ROM      Modalities      MH + ES L/S supine                                              THERAPEUTIC ACTIVITIES  ALL EXERCISE DONE WITH DRAW-IN TECHNIQUE Large, functional, dynamic, global movements used to build strength, balance, endurance, and flexibility and to improve physical performance. ROWS: H  \"    ROWS: M  \"    ROWS: L  \"    Obliques - high  \"    Obliques - low  \"          THEREX      Nustep        Punches      Lat pulldowns      Triceps ext standing      Marching            Trunk ext TB      Trunk flex TB      Hip abd      Hip EXT      TG Squats                  A:  Tolerated well.     P: Continue with rehab plan  Mitchell Viramontes PT    Treatment Charges: Mins Units   Initial Evaluation 26  1    Re-Evaluation     Ther Exercise         TE 8  1    Manual Therapy     MT     Ther Activities        TA     Gait Training          GT     Neuro Re-education NR     Modalities     Non-Billable Service Time     Other     Total Time/Units 34 2

## 2022-03-21 ENCOUNTER — HOSPITAL ENCOUNTER (OUTPATIENT)
Dept: PHYSICAL THERAPY | Age: 62
Setting detail: THERAPIES SERIES
Discharge: HOME | End: 2022-03-21
Payer: MEDICARE

## 2022-03-21 PROCEDURE — 97110 THERAPEUTIC EXERCISES: CPT | Performed by: PHYSICAL THERAPIST

## 2022-03-21 PROCEDURE — 97162 PT EVAL MOD COMPLEX 30 MIN: CPT | Performed by: PHYSICAL THERAPIST

## 2022-05-31 ENCOUNTER — HOSPITAL ENCOUNTER (EMERGENCY)
Age: 62
Discharge: HOME | End: 2022-05-31
Attending: EMERGENCY MEDICINE
Payer: MEDICARE

## 2022-05-31 VITALS
HEART RATE: 92 BPM | TEMPERATURE: 97.7 F | RESPIRATION RATE: 16 BRPM | SYSTOLIC BLOOD PRESSURE: 110 MMHG | HEIGHT: 73 IN | DIASTOLIC BLOOD PRESSURE: 72 MMHG | WEIGHT: 180 LBS | BODY MASS INDEX: 23.86 KG/M2 | OXYGEN SATURATION: 97 %

## 2022-05-31 DIAGNOSIS — H61.23 BILATERAL IMPACTED CERUMEN: Primary | ICD-10-CM

## 2022-05-31 PROCEDURE — 99282 EMERGENCY DEPT VISIT SF MDM: CPT

## 2022-05-31 ASSESSMENT — PAIN DESCRIPTION - ORIENTATION: ORIENTATION: LEFT

## 2022-05-31 ASSESSMENT — PAIN - FUNCTIONAL ASSESSMENT
PAIN_FUNCTIONAL_ASSESSMENT: 0-10
PAIN_FUNCTIONAL_ASSESSMENT: NONE - DENIES PAIN

## 2022-05-31 ASSESSMENT — PAIN DESCRIPTION - LOCATION: LOCATION: JAW

## 2022-05-31 ASSESSMENT — PAIN SCALES - GENERAL: PAINLEVEL_OUTOF10: 6

## 2022-05-31 NOTE — ED PROVIDER NOTES
HPI:  5/31/22,   Time: 1:38 PM EDT         Harish Bernard is a 57 y.o. male presenting to the ED for Chief complaint : my ears hurt from wax. This has been going on for several days. Denies Fever/CP/SOB/Palpitations. ROS:   Pertinent positives and negatives are stated within HPI, all other systems reviewed and are negative.  --------------------------------------------- PAST HISTORY ---------------------------------------------  Past Medical History:  has a past medical history of Heart attack [facility]), History of Holter monitoring, Neck pain, and Osteoarthritis. Past Surgical History:  has a past surgical history that includes Cardiac catheterization; Coronary angioplasty with stent; Nerve Block (06/19/2019); epidural steroid injection (Right, 6/19/2019); and Pain management procedure (N/A, 8/16/2021). Social History:  reports that he has been smoking cigarettes. He started smoking about 48 years ago. He has been smoking about 0.25 packs per day. He has never used smokeless tobacco. He reports current alcohol use of about 2.0 standard drinks of alcohol per week. He reports that he does not use drugs. Family History: family history includes Cancer in his father; No Known Problems in his mother. The patients home medications have been reviewed. Allergies: Patient has no known allergies. -------------------------------------------------- RESULTS -------------------------------------------------  All laboratory and radiology results have been personally reviewed by myself   LABS:  No results found for this visit on 05/31/22. RADIOLOGY:  Interpreted by Radiologist.  No orders to display       ------------------------- NURSING NOTES AND VITALS REVIEWED ---------------------------   The nursing notes within the ED encounter and vital signs as below have been reviewed.    /72   Pulse 92   Temp 97.7 °F (36.5 °C) (Temporal)   Resp 16   Ht 6' 1\" (1.854 m)   Wt 180 lb (81.6 kg)   SpO2 97%   BMI 23.75 kg/m²   Oxygen Saturation Interpretation: Normal      ---------------------------------------------------PHYSICAL EXAM--------------------------------------      Constitutional/General: Alert and oriented x3, well appearing, non toxic in NAD  Head: NC/AT  Eyes: PERRL, EOMI  Ears: Bilateral Cerumen impaction  Mouth: Oropharynx clear, handling secretions, no trismus  Neck: Supple, full ROM, no meningeal signs  Pulmonary: Lungs clear to auscultation bilaterally, no wheezes, rales, or rhonchi. Not in respiratory distress  Cardiovascular:  Regular rate and rhythm, no murmurs, gallops, or rubs. 2+ distal pulses  Abdomen: Soft, non tender, non distended,   Extremities: Moves all extremities x 4. Warm and well perfused  Skin: warm and dry without rash  Neurologic: GCS 15,  Psych: Normal Affect      ------------------------------ ED COURSE/MEDICAL DECISION MAKING----------------------  Medications - No data to display      Medical Decision Making:    Cerumen extracted by syringing done by Nurse Petey Noonan. Counseling: The emergency provider has spoken with the patient and discussed todays results, in addition to providing specific details for the plan of care and counseling regarding the diagnosis and prognosis. Questions are answered at this time and they are agreeable with the plan.      --------------------------------- IMPRESSION AND DISPOSITION ---------------------------------    IMPRESSION  1.  Bilateral impacted cerumen        DISPOSITION  Disposition: Discharge to home  Patient condition is good                  Tonya Hobbs MD  05/31/22 0398

## (undated) DEVICE — 3M™ RED DOT™ MONITORING ELECTRODE WITH FOAM TAPE AND STICKY GEL 2560, 50/BAG, 20/CASE, 72/PLT: Brand: RED DOT™

## (undated) DEVICE — 12 ML SYRINGE,LUER-LOCK TIP: Brand: MONOJECT

## (undated) DEVICE — BANDAGE ADH W1XL3IN NAT FAB WVN FLX DURABLE N ADH PD SEAL

## (undated) DEVICE — APPLICATOR MEDICATED 10.5 CC SOLUTION HI LT ORNG CHLORAPREP

## (undated) DEVICE — GAUZE,SPONGE,4"X4",12PLY,STERILE,LF,2'S: Brand: MEDLINE

## (undated) DEVICE — 6 ML SYRINGE LUER-LOCK TIP: Brand: MONOJECT

## (undated) DEVICE — Z DISCONTINUED APPLICATOR SURG PREP 0.35OZ 2% CHG 70% ISO ALC W/ HI LT

## (undated) DEVICE — NEEDLE HYPO 18GA L1.5IN PNK POLYPR HUB S STL THN WALL FILL

## (undated) DEVICE — GLOVE ORANGE PI 7 1/2   MSG9075

## (undated) DEVICE — 3 ML SYRINGE LUER-LOCK TIP: Brand: MONOJECT

## (undated) DEVICE — TOWEL,OR,DSP,ST,BLUE,STD,6/PK,12PK/CS: Brand: MEDLINE

## (undated) DEVICE — NEEDLE HYPO 25GA L1.5IN BLU POLYPR HUB S STL REG BVL STR

## (undated) DEVICE — NEEDLE HYPO 27GA L1.25IN GRY POLYPR HUB S STL REG BVL STR

## (undated) DEVICE — TRAY EPI SGL DOSE 18GA NDL CUST AULTMAN HOSP